# Patient Record
Sex: MALE | Race: BLACK OR AFRICAN AMERICAN | NOT HISPANIC OR LATINO | Employment: FULL TIME | URBAN - METROPOLITAN AREA
[De-identification: names, ages, dates, MRNs, and addresses within clinical notes are randomized per-mention and may not be internally consistent; named-entity substitution may affect disease eponyms.]

---

## 2017-03-20 DIAGNOSIS — A60.01 HERPES SIMPLEX INFECTION OF PENIS: ICD-10-CM

## 2017-03-20 NOTE — TELEPHONE ENCOUNTER
----- Message from Felisha Arambula sent at 3/16/2017  8:35 AM CDT -----  Contact: self / [phone]  Needs a refill on his valacyclovir (VALTREX) 1000 MG tablet

## 2017-03-22 RX ORDER — VALACYCLOVIR HYDROCHLORIDE 1 G/1
1000 TABLET, FILM COATED ORAL EVERY 12 HOURS
Qty: 20 TABLET | Refills: 0 | Status: SHIPPED | OUTPATIENT
Start: 2017-03-22 | End: 2017-12-08 | Stop reason: ALTCHOICE

## 2017-04-21 ENCOUNTER — HOSPITAL ENCOUNTER (EMERGENCY)
Facility: HOSPITAL | Age: 37
Discharge: HOME | End: 2017-04-21
Attending: EMERGENCY MEDICINE
Payer: COMMERCIAL

## 2017-04-21 VITALS
BODY MASS INDEX: 28.28 KG/M2 | SYSTOLIC BLOOD PRESSURE: 134 MMHG | HEIGHT: 71 IN | HEART RATE: 78 BPM | OXYGEN SATURATION: 100 % | WEIGHT: 202 LBS | RESPIRATION RATE: 18 BRPM | DIASTOLIC BLOOD PRESSURE: 88 MMHG | TEMPERATURE: 99 F
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
## 2017-04-21 DIAGNOSIS — R22.0 LIP SWELLING: Primary | ICD-10-CM

## 2017-04-21 DIAGNOSIS — S09.93XA FACIAL TRAUMA, INITIAL ENCOUNTER: ICD-10-CM

## 2017-04-21 PROCEDURE — 99284 EMERGENCY DEPT VISIT MOD MDM: CPT | Mod: ,,, | Performed by: EMERGENCY MEDICINE

## 2017-04-21 PROCEDURE — 99283 EMERGENCY DEPT VISIT LOW MDM: CPT | Mod: 25

## 2017-04-21 PROCEDURE — 96372 THER/PROPH/DIAG INJ SC/IM: CPT

## 2017-04-21 PROCEDURE — 63600175 PHARM REV CODE 636 W HCPCS: Performed by: EMERGENCY MEDICINE

## 2017-04-21 PROCEDURE — 25000003 PHARM REV CODE 250: Performed by: EMERGENCY MEDICINE

## 2017-04-21 RX ORDER — OXYCODONE AND ACETAMINOPHEN 5; 325 MG/1; MG/1
1 TABLET ORAL
Status: COMPLETED | OUTPATIENT
Start: 2017-04-21 | End: 2017-04-21

## 2017-04-21 RX ORDER — PENICILLIN V POTASSIUM 500 MG/1
500 TABLET, FILM COATED ORAL 4 TIMES DAILY
Qty: 40 TABLET | Refills: 0 | Status: SHIPPED | OUTPATIENT
Start: 2017-04-21 | End: 2017-04-28

## 2017-04-21 RX ORDER — OXYCODONE AND ACETAMINOPHEN 5; 325 MG/1; MG/1
1 TABLET ORAL EVERY 6 HOURS PRN
Qty: 20 TABLET | Refills: 0 | Status: SHIPPED | OUTPATIENT
Start: 2017-04-21 | End: 2017-12-08 | Stop reason: ALTCHOICE

## 2017-04-21 RX ADMIN — OXYCODONE HYDROCHLORIDE AND ACETAMINOPHEN 1 TABLET: 5; 325 TABLET ORAL at 07:04

## 2017-04-21 RX ADMIN — PENICILLIN G BENZATHINE AND PENICILLIN G PROCAINE 1.2 MILLION UNITS: 600000; 600000 INJECTION, SUSPENSION INTRAMUSCULAR at 07:04

## 2017-04-21 NOTE — ED AVS SNAPSHOT
[facility]  [address]  [facility] [address]               Alli Lezama   2017  6:47 AM   ED    Description:  Male : 1980   Department:  [facility]           Your Care was Coordinated By:     Provider Role From To    Micky Walker MD Attending Provider 17 0648 --      Reason for Visit     Oral Swelling           Diagnoses this Visit        Comments    Lip swelling    -  Primary     Facial trauma, initial encounter           ED Disposition     ED Disposition Condition Comment    Discharge             To Do List            These Medications        Disp Refills Start End    oxycodone-acetaminophen (PERCOCET) 5-325 mg per tablet 20 tablet 0 2017     Take 1 tablet by mouth every 6 (six) hours as needed. - Oral    Pharmacy: [facility] [id number]  [place] - [address] AT [facility] Ph #: [phone]       penicillin v potassium (VEETID) 500 MG tablet 40 tablet 0 2017    Take 1 tablet (500 mg total) by mouth 4 (four) times daily. - Oral    Pharmacy: [facility] [id number] - [place] - [address] AT [facility] Ph #: [phone]         [facility]     [facility] Nurse Care Line -  Assistance  Unless otherwise directed by your provider, please contact [facility], our nurse care line that is available for  assistance.     Registered nurses in the [facility] provide: appointment scheduling, clinical advisement, health education, and other advisory services.  Call: [phone] (toll free)               Medications           Message regarding Medications     Verify the changes and/or additions to your medication regime listed below are the same as discussed with your clinician today.  If any of these changes or additions are incorrect, please notify your healthcare provider.        START taking these  "NEW medications        Refills    oxycodone-acetaminophen (PERCOCET) 5-325 mg per tablet 0    Sig: Take 1 tablet by mouth every 6 (six) hours as needed.    Class: Print    Route: Oral    penicillin v potassium (VEETID) 500 MG tablet 0    Sig: Take 1 tablet (500 mg total) by mouth 4 (four) times daily.    Class: Print    Route: Oral      These medications were administered today        Dose Freq    oxycodone-acetaminophen 5-325 mg per tablet 1 tablet 1 tablet ED 1 Time    Sig: Take 1 tablet by mouth ED 1 Time.    Class: Normal    Route: Oral    penicillin G procaine-penicillin G benzathine (BICILLIN-CR) injection 1.2 Million Units 1.2 Million Units ED 1 Time    Sig: Inject 2 mLs (1.2 Million Units total) into the muscle ED 1 Time.    Class: Normal    Route: Intramuscular           Verify that the below list of medications is an accurate representation of the medications you are currently taking.  If none reported, the list may be blank. If incorrect, please contact your healthcare provider. Carry this list with you in case of emergency.           Current Medications     oxycodone-acetaminophen (PERCOCET) 5-325 mg per tablet Take 1 tablet by mouth every 6 (six) hours as needed.    oxycodone-acetaminophen 5-325 mg per tablet 1 tablet Take 1 tablet by mouth ED 1 Time.    penicillin G procaine-penicillin G benzathine (BICILLIN-CR) injection 1.2 Million Units Inject 2 mLs (1.2 Million Units total) into the muscle ED 1 Time.    penicillin v potassium (VEETID) 500 MG tablet Take 1 tablet (500 mg total) by mouth 4 (four) times daily.    valacyclovir (VALTREX) 1000 MG tablet Take 1 tablet (1,000 mg total) by mouth every 12 (twelve) hours. FOR SHINGLES           Clinical Reference Information           Your Vitals Were     BP Pulse Temp Resp Height Weight    134/88 78 98.5 °F (36.9 °C) (Oral) 18 5' 11" (1.803 m) 91.6 kg (202 lb)    SpO2 BMI             100% 28.17 kg/m2         Allergies as of 4/21/2017     No Known Allergies "      Immunizations Administered on Date of Encounter - 4/21/2017     None      ED Micro, Lab, POCT     None      ED Imaging Orders     None        Discharge Instructions       Keep the area clean and dry  Take the antibiotics as prescribed  Return if you develop fever, can't swallow or you have noisy respirations  Have the sutures removed as initially instructed    Apolinarspranay Sign-Up     Activating your [facility] account is as easy as 1-2-3!     1) Visit [email or web address], select Sign Up Now, enter this activation code and your date of birth, then select Next.  [email or web address]  Expires: 6/5/2017  7:07 AM      2) Create a username and password to use when you visit [facility] in the future and select a security question in case you lose your password and select Next.    3) Enter your e-mail address and click Sign Up!    Additional Information  If you have questions, please e-mail [email or web address] or call [phone] to talk to our [facility] staff. Remember, [facility] is NOT to be used for urgent needs. For medical emergencies, dial 911.         Smoking Cessation     If you would like to quit smoking:   You may be eligible for free services if you are a [place] resident and started smoking cigarettes before September 1, 1988.  Call the Smoking Cessation Trust (SCT) toll free at (168) 630-8321 or (052) 060-5731.   Call 2-279-QUIT-NOW if you do not meet the above criteria.   Contact us via email: [email or web address]   View our website for more information: [email or web address]         [facility] complies with applicable Federal civil rights laws and does not discriminate on the basis of race, color, national origin, age, disability, or sex.        Language Assistance Services     ATTENTION: Language assistance services are available, free of charge. Please call [phone].      ATENCIÓN: Si habla español, tiene a zayas disposición servicios gratuitos de asistencia lingüística.  Michle vega [phone].     CYNDIE Ý: N?u b?n nói Ti?ng Vi?t, có các d?ch v? h? tr? angelaôn ng? mi?n phí dành cho b?n. G?i s? [phone].

## 2017-04-21 NOTE — ED TRIAGE NOTES
Injured at work yesterday, was hit in the lip by a crowbar, and went to an urgent care yesterday and received 7 stitches on the outer lip and 7 stitches to inner lip, and today woke up in pain, and was not given pain medication or antibiotics. Pt rates pain 9/10.

## 2017-04-21 NOTE — DISCHARGE INSTRUCTIONS
Keep the area clean and dry  Take the antibiotics as prescribed  Return if you develop fever, can't swallow or you have noisy respirations  Have the sutures removed as initially instructed

## 2017-04-21 NOTE — ED PROVIDER NOTES
Encounter Date: 4/21/2017    SCRIBE #1 NOTE: I, Italiaflore[address]ita Street, am scribing for, and in the presence of, Dr. Walker.       History     Chief Complaint   Patient presents with    Oral Swelling     pt states he was changing tire yesterday and bar slipped out and hit him in the face, was seen by primary MD and has stiches placed and today woke up with super swollen bottom lip, reports pain     Review of patient's allergies indicates:  No Known Allergies  HPI Comments: Time patient was seen by the provider: 6:48 AM      The patient is a 37 y.o. male with hx of: HTN, gout, hernia repair that presents to the ED with a complaint of oral swelling. Pt was changing a tire yesterday and the bar slipped out and hit him in the face. He also chipped some of his teeth. Pt was seen by his PCP and had stiches placed but was not placed on antibiotics. He woke today with swelling to the bottom lip and associated pain. NKDA. He denies fever, difficulty swallowing, N/V.    The history is provided by the patient.     Past Medical History:   Diagnosis Date    Cardiac murmur     Chlamydia infection     Cluster headaches     Costochondritis     Gout, chronic     Hypertension      Past Surgical History:   Procedure Laterality Date    HERNIA REPAIR       Family History   Problem Relation Age of Onset    Hypertension Mother     Heart disease Maternal Grandmother      Social History   Substance Use Topics    Smoking status: Former Smoker     Quit date: 1/20/2001    Smokeless tobacco: None    Alcohol use Yes      Comment: occ beer     Review of Systems   Constitutional: Negative for activity change, chills, fatigue and fever.   HENT: Positive for facial swelling. Negative for sinus pressure, sore throat and trouble swallowing.    Eyes: Negative for photophobia and visual disturbance.   Gastrointestinal: Negative for nausea and vomiting.   Skin: Positive for wound.   Neurological: Negative for weakness and light-headedness.    Hematological: Does not bruise/bleed easily.       Physical Exam   Initial Vitals   BP Pulse Resp Temp SpO2   04/21/17 0611 04/21/17 0611 04/21/17 0611 04/21/17 0611 04/21/17 0611   134/88 78 18 98.5 °F (36.9 °C) 100 %     Physical Exam    Nursing note and vitals reviewed.  Constitutional: He is cooperative. He does not appear ill. No distress.   HENT:   Head: Normocephalic.   No trismus  Sutured laceration of lip of localized swelling   Eyes: Conjunctivae and lids are normal.   Neck: Normal range of motion. Neck supple.   Musculoskeletal: Normal range of motion.   Neurological: He is alert. No cranial nerve deficit. GCS eye subscore is 4. GCS verbal subscore is 5. GCS motor subscore is 6.   Skin:   Swelling of the lower lip of face that is sutured no drainage         ED Course   Procedures  Labs Reviewed - No data to display          Medical Decision Making:   History:   Old Medical Records: I decided to obtain old medical records.  Initial Assessment:   Sutured laceration of lip now with localized swelling this followed after trauma wound was sutured no antibiotics given no need for imaging or labs            Scribe Attestation:   Scribe #1: I performed the above scribed service and the documentation accurately describes the services I performed. I attest to the accuracy of the note.    Attending Attestation:           Physician Attestation for Scribe:  Physician Attestation Statement for Scribe #1: I, Dr. Walker, reviewed documentation, as scribed by Italia Street in my presence, and it is both accurate and complete.         Attending ED Notes:   Patient evaluated in the ED because of significant swelling of the face and lips patient was struck while attempting to place a tire on of we'll sustaining an laceration of lower lip patient was evaluated by physician sutured patient was not placed on antibiotics patient states that he had no foreign body noted either physician he is concerned because of  continued swelling this could just be localized trauma however concern for possibility of early infection patient was placed on antibiotics by mouth and the patient is discharged          ED Course     Clinical Impression:   The primary encounter diagnosis was Lip swelling. A diagnosis of Facial trauma, initial encounter was also pertinent to this visit.    Disposition:   Disposition: Discharged  Condition: Stable       Micky Walker MD  05/08/17 0912

## 2017-04-21 NOTE — ED NOTES
Pt instructed on shot time.  Pt ambulatory at discharge.  No distress. Discharge instructions given.  Vitals stable.  rx given.  Walked to checkout.

## 2017-09-19 ENCOUNTER — OFFICE VISIT (OUTPATIENT)
Dept: FAMILY MEDICINE | Facility: HOSPITAL | Age: 37
End: 2017-09-19
Attending: FAMILY MEDICINE
Payer: COMMERCIAL

## 2017-09-19 VITALS
SYSTOLIC BLOOD PRESSURE: 129 MMHG | BODY MASS INDEX: 28.18 KG/M2 | WEIGHT: 196.88 LBS | DIASTOLIC BLOOD PRESSURE: 85 MMHG | HEIGHT: 70 IN | HEART RATE: 109 BPM

## 2017-09-19 DIAGNOSIS — Z00.00 HEALTHCARE MAINTENANCE: Primary | ICD-10-CM

## 2017-09-19 DIAGNOSIS — Z20.2 STD EXPOSURE: ICD-10-CM

## 2017-09-19 PROCEDURE — 99213 OFFICE O/P EST LOW 20 MIN: CPT | Performed by: FAMILY MEDICINE

## 2017-09-19 RX ORDER — AZITHROMYCIN 1 G/1
1 POWDER, FOR SUSPENSION ORAL ONCE
Qty: 1 PACKET | Refills: 0 | Status: SHIPPED | OUTPATIENT
Start: 2017-09-19 | End: 2017-09-19

## 2017-09-19 NOTE — PROGRESS NOTES
I have reviewed the notes, assessments, and/or procedures performed by Dr. Valera, I concur with her/his documentation of Alli Lezama.

## 2017-09-19 NOTE — PROGRESS NOTES
Subjective:       Patient ID: Alli Lezama is a 37 y.o. male.    Chief Complaint: Exposure to STD    HPI   37 year old male presented for exposure to STD. He has had Chlamydia in the past and states that his wife was recently treated for a STD. He denies any dysuria, penile discharge or lesions. He denies any fevers or chills.    Review of Systems   Constitutional: Negative for fatigue and fever.   HENT: Negative for sore throat.    Eyes: Negative for visual disturbance.   Cardiovascular: Negative for chest pain.   Gastrointestinal: Negative for diarrhea and vomiting.   Genitourinary: Negative for difficulty urinating, discharge, dysuria, penile pain, penile swelling and scrotal swelling.   Neurological: Negative for headaches.       Objective:      Vitals:    09/19/17 1453   BP: 129/85   Pulse: 109     Physical Exam   Constitutional: He is oriented to person, place, and time. He appears well-developed and well-nourished.   HENT:   Head: Normocephalic and atraumatic.   Neck: Normal range of motion.   Cardiovascular: Normal rate and regular rhythm.    No murmur heard.  Pulmonary/Chest: Effort normal. No respiratory distress.   Abdominal: Soft.   Musculoskeletal: Normal range of motion.   Neurological: He is alert and oriented to person, place, and time.   Skin: Skin is warm and dry.       Assessment:       1. Healthcare maintenance    2. STD exposure        Plan:       Healthcare maintenance  -     Lipid panel; Future; Expected date: 09/19/2017    STD exposure  -     azithromycin (ZITHROMAX) 1 gram Pack; Take 1 g by mouth once.  Dispense: 1 packet; Refill: 0      Return if symptoms worsen or fail to improve.      Pt discussed with Dr. Lawrence.    Alessia Valera MD

## 2017-12-08 ENCOUNTER — HOSPITAL ENCOUNTER (EMERGENCY)
Facility: HOSPITAL | Age: 37
Discharge: HOME | End: 2017-12-08
Attending: EMERGENCY MEDICINE
Payer: COMMERCIAL

## 2017-12-08 VITALS
TEMPERATURE: 99 F | HEIGHT: 70 IN | WEIGHT: 202 LBS | DIASTOLIC BLOOD PRESSURE: 108 MMHG | RESPIRATION RATE: 19 BRPM | HEART RATE: 78 BPM | OXYGEN SATURATION: 100 % | SYSTOLIC BLOOD PRESSURE: 163 MMHG | BODY MASS INDEX: 28.92 KG/M2

## 2017-12-08 DIAGNOSIS — K62.5 RECTAL BLEEDING: Primary | ICD-10-CM

## 2017-12-08 PROCEDURE — 99283 EMERGENCY DEPT VISIT LOW MDM: CPT

## 2017-12-08 RX ORDER — DOCUSATE SODIUM 100 MG/1
100 CAPSULE, LIQUID FILLED ORAL 2 TIMES DAILY
Qty: 60 CAPSULE | Refills: 0 | Status: SHIPPED | OUTPATIENT
Start: 2017-12-08 | End: 2018-06-07

## 2017-12-08 NOTE — ED PROVIDER NOTES
Encounter Date: 12/8/2017       History     Chief Complaint   Patient presents with    Rectal Bleeding     Blood noted afterwiping post bowel movment this morning.  Denies hemorrhoids, denies abd pain, N/V/D     37-year-old male with past medical history of hypertension presents to the ED for evaluation of rectal bleeding.  The patient reports that he had a large hard bowel movement this morning, and noticed bright red blood on the toilet paper after wiping.  He reports that his stool was also streaked with bright red blood.  No fever, chills, trauma, abdominal pain, nausea/vomiting/diarrhea, or wound.  He denies history of hemorrhoids.  He denies rectal pain.  No history of bowel surgery or colonoscopy.  No other complaints this time.      The history is provided by the patient.   Rectal Bleeding    The current episode started just prior to arrival. The onset was sudden. Episode frequency: once. The problem has been resolved. The pain is at a severity of 0/10. The stool is described as hard and streaked with blood. There was no prior successful therapy. There was no prior unsuccessful therapy. Pertinent negatives include no anorexia, no fever, no abdominal pain, no diarrhea, no hemorrhoids, no nausea, no rectal pain, no vomiting, no hematuria, no chest pain, no headaches, no coughing, no difficulty breathing and no rash. He has been eating and drinking normally. Urine output has been normal. The last void occurred less than 6 hours ago. His past medical history does not include abdominal surgery, inflammatory bowel disease, recent abdominal injury, recent antibiotic use, recent change in diet or a recent illness. There were sick contacts none. He has received no recent medical care.     Review of patient's allergies indicates:  No Known Allergies  Past Medical History:   Diagnosis Date    Cardiac murmur     Chlamydia infection     Cluster headaches     Costochondritis     Gout, chronic     Hypertension       Past Surgical History:   Procedure Laterality Date    HERNIA REPAIR       Family History   Problem Relation Age of Onset    Hypertension Mother     Heart disease Maternal Grandmother      Social History   Substance Use Topics    Smoking status: Former Smoker     Quit date: 1/20/2001    Smokeless tobacco: Not on file    Alcohol use Yes      Comment: occ beer     Review of Systems   Constitutional: Negative for chills and fever.   HENT: Negative for congestion.    Respiratory: Negative for cough and shortness of breath.    Cardiovascular: Negative for chest pain.   Gastrointestinal: Positive for anal bleeding, blood in stool and hematochezia. Negative for abdominal pain, anorexia, diarrhea, hemorrhoids, nausea, rectal pain and vomiting.   Genitourinary: Negative for dysuria and hematuria.   Musculoskeletal: Negative for back pain.   Skin: Negative for rash.   Allergic/Immunologic: Negative for immunocompromised state.   Neurological: Negative for dizziness, weakness, light-headedness and headaches.   Hematological: Does not bruise/bleed easily.   Psychiatric/Behavioral: Negative for confusion.       Physical Exam     Initial Vitals [12/08/17 1211]   BP Pulse Resp Temp SpO2   (!) 163/108 78 19 98.6 °F (37 °C) 100 %      MAP       126.33         Physical Exam    Nursing note and vitals reviewed.  Constitutional: Vital signs are normal. He appears well-developed and well-nourished. He is active and cooperative. He is easily aroused.  Non-toxic appearance. He does not have a sickly appearance. He does not appear ill. No distress.   HENT:   Head: Normocephalic and atraumatic.   Eyes: Conjunctivae are normal.   Neck: Normal range of motion.   Cardiovascular: Normal rate and regular rhythm.   Murmur heard.   No systolic murmur is present   Pulmonary/Chest: Effort normal and breath sounds normal.   Abdominal: Soft. Normal appearance and bowel sounds are normal. He exhibits no distension. There is no tenderness.  There is no rigidity, no rebound and no guarding.   Genitourinary: Rectal exam shows no external hemorrhoid, no internal hemorrhoid, no fissure, no mass, no tenderness and anal tone normal.   Genitourinary Comments: No BRB.  No visualized or palpated hemorrhoids.  No bleeding noted.    Neurological: He is alert, oriented to person, place, and time and easily aroused. GCS eye subscore is 4. GCS verbal subscore is 5. GCS motor subscore is 6.   Skin: Skin is warm, dry and intact. No abrasion, no bruising and no rash noted. No erythema.   Psychiatric: He has a normal mood and affect. His speech is normal and behavior is normal. Judgment and thought content normal. Cognition and memory are normal.         ED Course   Procedures  Labs Reviewed   OCCULT BLOOD X 1, STOOL             Medical Decision Making:   Initial Assessment:   37-year-old male here for one episode of bright red blood on the tissue when wiping after a large hard bowel movement this morning.  No trauma, fever/chills, abdominal pain, or anticoagulant use.  He denies lightheadedness, dizziness, weakness.  Abd soft, non-tender, no r/r/g, no distention.  Pt denies rectal pain.  Rectal exam without BRB.  Differential Diagnosis:   Constipation, fissure, hemorrhoid  ED Management:  Rectal Exam  No BRB noted on rectal exam.  Pt likely has internal hemorrhoid.  I advised increased fluid intake and f/u with PCP within 3 days.  I reviewed strict return precautions.  Pt verbalized understanding, compliance, and agreement with the treatment plan.    RX Colace.              Attending Attestation:     Physician Attestation Statement for NP/PA:   I have conducted a face to face encounter with this patient in addition to the NP/PA, due to NP/PA Request    Other NP/PA Attestation Additions:    History of Present Illness: Agree; 37-year-old male presents emergency department complaining of rectal bleeding.  Patient reports a hard bowel movement earlier and noticed bright  red blood per after wiping.  No other symptoms reported.   Physical Exam: agree   Medical Decision Making: Agree; bright red blood on rectal exam with no external hemorrhoids.  Informed of likely diagnosis of internal hemorrhoids, given a prescription for Colace, instructed to follow-up with primary care physician, return with new or worsening symptoms.                 ED Course      Clinical Impression:   The encounter diagnosis was Rectal bleeding.                           FLORENCE Lim  12/08/17 1301       Zain Francisco MD  12/12/17 2352

## 2017-12-08 NOTE — ED TRIAGE NOTES
Pt reports was at work and after having a bowel movement noticed bright blood on toilet paper and a small amount in toilet.  Denies abdominal pain, denies N/V.  BP elevated at triage, denies HTN or taking any BP meds.

## 2018-05-22 ENCOUNTER — OFFICE VISIT (OUTPATIENT)
Dept: FAMILY MEDICINE | Facility: HOSPITAL | Age: 38
End: 2018-05-22
Attending: FAMILY MEDICINE
Payer: COMMERCIAL

## 2018-05-22 ENCOUNTER — LAB VISIT (OUTPATIENT)
Dept: LAB | Facility: HOSPITAL | Age: 38
End: 2018-05-22
Attending: FAMILY MEDICINE
Payer: COMMERCIAL

## 2018-05-22 VITALS
BODY MASS INDEX: 28.58 KG/M2 | HEART RATE: 94 BPM | SYSTOLIC BLOOD PRESSURE: 137 MMHG | WEIGHT: 204.13 LBS | HEIGHT: 71 IN | DIASTOLIC BLOOD PRESSURE: 88 MMHG

## 2018-05-22 DIAGNOSIS — A64 STD (MALE): ICD-10-CM

## 2018-05-22 DIAGNOSIS — B00.9 HERPES: Primary | ICD-10-CM

## 2018-05-22 LAB
C TRACH DNA SPEC QL NAA+PROBE: NOT DETECTED
N GONORRHOEA DNA SPEC QL NAA+PROBE: NOT DETECTED

## 2018-05-22 PROCEDURE — 87491 CHLMYD TRACH DNA AMP PROBE: CPT

## 2018-05-22 PROCEDURE — 96372 THER/PROPH/DIAG INJ SC/IM: CPT

## 2018-05-22 PROCEDURE — 99213 OFFICE O/P EST LOW 20 MIN: CPT | Performed by: FAMILY MEDICINE

## 2018-05-22 PROCEDURE — 86592 SYPHILIS TEST NON-TREP QUAL: CPT

## 2018-05-22 PROCEDURE — 86703 HIV-1/HIV-2 1 RESULT ANTBDY: CPT

## 2018-05-22 PROCEDURE — 36415 COLL VENOUS BLD VENIPUNCTURE: CPT

## 2018-05-22 RX ORDER — AZITHROMYCIN 1 G/1
1 POWDER, FOR SUSPENSION ORAL ONCE
Qty: 1 PACKET | Refills: 0 | Status: SHIPPED | OUTPATIENT
Start: 2018-05-22 | End: 2018-05-22

## 2018-05-22 RX ORDER — CEFTRIAXONE 250 MG/1
250 INJECTION, POWDER, FOR SOLUTION INTRAMUSCULAR; INTRAVENOUS ONCE
Status: COMPLETED | OUTPATIENT
Start: 2018-05-22 | End: 2018-05-22

## 2018-05-22 RX ORDER — VALACYCLOVIR HYDROCHLORIDE 1 G/1
1000 TABLET, FILM COATED ORAL EVERY 12 HOURS
Qty: 10 TABLET | Refills: 0 | Status: SHIPPED | OUTPATIENT
Start: 2018-05-22 | End: 2018-06-07 | Stop reason: SDUPTHER

## 2018-05-22 RX ADMIN — CEFTRIAXONE SODIUM 250 MG: 250 INJECTION, POWDER, FOR SOLUTION INTRAMUSCULAR; INTRAVENOUS at 04:05

## 2018-05-22 NOTE — PROGRESS NOTES
Subjective:       Patient ID: Alli Lezama is a 38 y.o. male.    Chief Complaint: Exposure to STD    HPI   38 year old male with a history of Chlamydia and Herpes Genitalis that presents with 72 hours of penile lesions. He states that he had sex with a new partner in the last few months. He would like to be tested for STDs again. He denies any dysuria or hematuria. He denies any penile discharge, however his penis is sensitive and he has multiple small lesions noted around the head of the penis that are fluid filled.   Review of Systems   Constitutional: Negative for fatigue and fever.   HENT: Negative for sore throat.    Eyes: Negative for visual disturbance.   Respiratory: Negative for shortness of breath and wheezing.    Cardiovascular: Negative for chest pain and palpitations.   Gastrointestinal: Negative for nausea and vomiting.   Genitourinary: Positive for genital sores and penile pain. Negative for decreased urine volume, difficulty urinating, discharge, dysuria, hematuria and testicular pain.   Musculoskeletal: Negative for neck stiffness.   Skin: Negative for rash.   Neurological: Negative for headaches.       Objective:      Vitals:    05/22/18 1500   BP: 137/88   Pulse: 94     Physical Exam   Constitutional: He is oriented to person, place, and time. He appears well-developed and well-nourished.   HENT:   Head: Normocephalic and atraumatic.   Neck: Normal range of motion.   Pulmonary/Chest: Effort normal. No respiratory distress.   Abdominal: Soft. He exhibits no distension. There is no tenderness.   Genitourinary: Right testis shows no tenderness. Left testis shows no tenderness. Penile erythema and penile tenderness present. No discharge found.         Musculoskeletal: Normal range of motion.   Lymphadenopathy: No inguinal adenopathy noted on the right or left side.   Neurological: He is alert and oriented to person, place, and time.   Skin: Skin is warm and dry.   Psychiatric: He has a normal mood  and affect.       Assessment:       1. Herpes    2. STD (male)        Plan:       Herpes  -     valACYclovir (VALTREX) 1000 MG tablet; Take 1 tablet (1,000 mg total) by mouth every 12 (twelve) hours.  Dispense: 10 tablet; Refill: 0  -     Viral Culture [facility]; Other (Specify); Future; Expected date: 05/22/2018    STD (male)  -     HIV-1 and HIV-2 antibodies; Future; Expected date: 05/22/2018  -     RPR; Future; Expected date: 05/22/2018  -     C. trachomatis/N. gonorrhoeae by AMP DNA Urine; Future; Expected date: 05/22/2018  -     cefTRIAXone injection 250 mg; Inject 250 mg into the muscle once.  -     azithromycin (ZITHROMAX) 1 gram Pack; Take 1 g by mouth once.  Dispense: 1 packet; Refill: 0  -     valACYclovir (VALTREX) 1000 MG tablet; Take 1 tablet (1,000 mg total) by mouth every 12 (twelve) hours.  Dispense: 10 tablet; Refill: 0  -     Viral Culture [facility]; Other (Specify); Future; Expected date: 05/22/2018      Follow-up in about 2 weeks (around 6/5/2018).      Pt discussed with Dr. Lawrence.    Will give him treatment for his wife if his labs are positive.    Alessia Valera MD

## 2018-05-23 LAB
HIV 1+2 AB+HIV1 P24 AG SERPL QL IA: NEGATIVE
RPR SER QL: NORMAL

## 2018-06-07 ENCOUNTER — OFFICE VISIT (OUTPATIENT)
Dept: FAMILY MEDICINE | Facility: HOSPITAL | Age: 38
End: 2018-06-07
Attending: FAMILY MEDICINE
Payer: COMMERCIAL

## 2018-06-07 VITALS
HEART RATE: 91 BPM | HEIGHT: 71 IN | BODY MASS INDEX: 29.16 KG/M2 | SYSTOLIC BLOOD PRESSURE: 143 MMHG | WEIGHT: 208.31 LBS | DIASTOLIC BLOOD PRESSURE: 98 MMHG

## 2018-06-07 DIAGNOSIS — A64 STD (MALE): ICD-10-CM

## 2018-06-07 DIAGNOSIS — B00.9 HERPES: ICD-10-CM

## 2018-06-07 PROBLEM — A74.9 CHLAMYDIA: Status: ACTIVE | Noted: 2018-06-07

## 2018-06-07 PROCEDURE — 99213 OFFICE O/P EST LOW 20 MIN: CPT | Performed by: FAMILY MEDICINE

## 2018-06-07 RX ORDER — VALACYCLOVIR HYDROCHLORIDE 1 G/1
1000 TABLET, FILM COATED ORAL EVERY 12 HOURS
Qty: 10 TABLET | Refills: 0 | Status: SHIPPED | OUTPATIENT
Start: 2018-06-07 | End: 2019-08-23

## 2018-06-07 NOTE — PROGRESS NOTES
Subjective:       Patient ID: Alli Lezama is a 38 y.o. male.    Chief Complaint: STD follow-up    HPI   38 year old male presents for follow up of STD check. His G/C was negative and he was empirically treated for herpes as he has been treated with Herpes previously. He completed the course of Valtrex with resolution of his symptoms. He denies any current penile lesions or dysuria.     Review of Systems   Constitutional: Negative for fever.   HENT: Negative for sore throat.    Eyes: Negative for visual disturbance.   Cardiovascular: Negative for chest pain.   Gastrointestinal: Negative for nausea and vomiting.   Genitourinary: Negative for difficulty urinating, discharge, dysuria and penile pain.   Skin: Negative for rash.   Neurological: Negative for headaches.   All other systems reviewed and are negative.      Objective:      Vitals:    06/07/18 1023   BP: (!) 143/98   Pulse: 91     Physical Exam   Constitutional: He is oriented to person, place, and time. He appears well-developed and well-nourished.   HENT:   Head: Normocephalic and atraumatic.   Neck: Normal range of motion.   Pulmonary/Chest: Effort normal. No respiratory distress. He has no wheezes.   Abdominal: Soft.   Musculoskeletal: Normal range of motion.   Neurological: He is alert and oriented to person, place, and time.   Skin: Skin is warm and dry. No rash noted.   Psychiatric: He has a normal mood and affect.       Assessment:       1. Herpes    2. STD (male)        Plan:       Herpes  -     valACYclovir (VALTREX) 1000 MG tablet; Take 1 tablet (1,000 mg total) by mouth every 12 (twelve) hours.  Dispense: 10 tablet; Refill: 0, written for patient in case of penile lesions so that he may start taking medicine if signs or symptoms present    Chlamydia previously        -      Negative on last lab    Follow-up if symptoms worsen or fail to improve.        Pt discussed with Dr. Joe.    Alessia Valera MD

## 2018-06-14 NOTE — PROGRESS NOTES
I was present in clinic during the visit and assume the primary medical care of this patient.  I discussed the case with Dr. Valera, and I have reviewed and agree with the assessment and plan.

## 2019-02-08 ENCOUNTER — HOSPITAL ENCOUNTER (EMERGENCY)
Facility: HOSPITAL | Age: 39
Discharge: HOME | End: 2019-02-08
Attending: EMERGENCY MEDICINE
Payer: COMMERCIAL

## 2019-02-08 VITALS
HEART RATE: 117 BPM | RESPIRATION RATE: 18 BRPM | SYSTOLIC BLOOD PRESSURE: 143 MMHG | BODY MASS INDEX: 29.4 KG/M2 | WEIGHT: 210 LBS | DIASTOLIC BLOOD PRESSURE: 92 MMHG | HEIGHT: 71 IN | OXYGEN SATURATION: 100 % | TEMPERATURE: 101 F

## 2019-02-08 DIAGNOSIS — R50.9 FEVER, UNSPECIFIED FEVER CAUSE: ICD-10-CM

## 2019-02-08 DIAGNOSIS — R68.89 FLU-LIKE SYMPTOMS: Primary | ICD-10-CM

## 2019-02-08 DIAGNOSIS — R52 BODY ACHES: ICD-10-CM

## 2019-02-08 LAB
CTP QC/QA: YES
POC MOLECULAR INFLUENZA A AGN: NEGATIVE
POC MOLECULAR INFLUENZA B AGN: NEGATIVE

## 2019-02-08 PROCEDURE — 25000003 PHARM REV CODE 250: Performed by: PHYSICIAN ASSISTANT

## 2019-02-08 PROCEDURE — 99284 PR EMERGENCY DEPT VISIT,LEVEL IV: ICD-10-PCS | Mod: ,,, | Performed by: PHYSICIAN ASSISTANT

## 2019-02-08 PROCEDURE — 99284 EMERGENCY DEPT VISIT MOD MDM: CPT | Mod: ,,, | Performed by: PHYSICIAN ASSISTANT

## 2019-02-08 PROCEDURE — 99283 EMERGENCY DEPT VISIT LOW MDM: CPT

## 2019-02-08 RX ORDER — PROMETHAZINE HYDROCHLORIDE AND DEXTROMETHORPHAN HYDROBROMIDE 6.25; 15 MG/5ML; MG/5ML
SYRUP ORAL
Qty: 60 ML | Refills: 0 | Status: SHIPPED | OUTPATIENT
Start: 2019-02-08 | End: 2019-08-23

## 2019-02-08 RX ORDER — ACETAMINOPHEN 500 MG
1000 TABLET ORAL
Status: COMPLETED | OUTPATIENT
Start: 2019-02-08 | End: 2019-02-08

## 2019-02-08 RX ADMIN — ACETAMINOPHEN 1000 MG: 500 TABLET ORAL at 04:02

## 2019-02-08 NOTE — ED TRIAGE NOTES
Pt reports body aches, productive cough, nasal drainage x 2 days; pt A&Ox4; respirations even, unlabored; lung sounds clear

## 2019-02-08 NOTE — ED PROVIDER NOTES
Encounter Date: 2019       History     Chief Complaint   Patient presents with    Influenza     started yest, cough, congestion, fever      The patient presents to the ER for an emergent evaluation due to flu symptoms. He reports having fever, chills, body aches, cough, and congestion since yesterday. He states that the degree is moderate. He states that the course is constant. He denies any additional symptoms. He denies any pre-arrival treatment. He did not get a flu shot this year. He states that 2 guys he works in close proximity with at [employer] recently had the flu.           Review of patient's allergies indicates:  No Known Allergies  Past Medical History:   Diagnosis Date    Cardiac murmur     Chlamydia infection     Cluster headaches     Costochondritis     Gout, chronic     Hypertension      Past Surgical History:   Procedure Laterality Date    HERNIA REPAIR       Family History   Problem Relation Age of Onset    Hypertension Mother     Heart disease Maternal Grandmother      Social History     Tobacco Use    Smoking status: Former Smoker     Last attempt to quit: 2001     Years since quittin.0    Smokeless tobacco: Never Used   Substance Use Topics    Alcohol use: Yes     Comment: occ beer    Drug use: No     Review of Systems   Constitutional: Positive for chills and fever.   HENT: Positive for congestion and rhinorrhea. Negative for sore throat.    Eyes: Negative for discharge and redness.   Respiratory: Positive for cough. Negative for shortness of breath, wheezing and stridor.    Cardiovascular: Negative for chest pain, palpitations and leg swelling.   Gastrointestinal: Negative for abdominal pain, diarrhea, nausea and vomiting.   Genitourinary: Negative for decreased urine volume.   Musculoskeletal: Positive for myalgias. Negative for arthralgias, back pain, gait problem, neck pain and neck stiffness.   Skin: Negative for color change and rash.   Allergic/Immunologic:  Negative for immunocompromised state.   Neurological: Negative for dizziness, seizures, syncope, weakness, light-headedness, numbness and headaches.   Hematological: Negative for adenopathy.   Psychiatric/Behavioral: Negative for confusion.       Physical Exam     Initial Vitals [02/08/19 1557]   BP Pulse Resp Temp SpO2   (!) 143/92 (!) 123 18 (!) 101.7 °F (38.7 °C) 100 %      MAP       --         Physical Exam    Nursing note and vitals reviewed.  Constitutional: He appears well-developed and well-nourished.   HENT:   Head: Normocephalic.   Mouth/Throat: Oropharynx is clear and moist. No oropharyngeal exudate.   Swollen nasal mucosa. Rhinorrhea. Clear oropharynx. No adenopathy. Normal otic exam.    Eyes: Conjunctivae are normal. Right eye exhibits no discharge. Left eye exhibits no discharge.   Neck: Normal range of motion. Neck supple.   Non-tender. No nuchal rigidity. No meningeal signs.    Cardiovascular: Normal rate, regular rhythm and intact distal pulses.   Pulmonary/Chest: Breath sounds normal. No respiratory distress. He has no wheezes. He has no rhonchi. He has no rales.   Occasional wet cough.    Abdominal: Soft. There is no tenderness. There is no rebound and no guarding.   Musculoskeletal: Normal range of motion.   Lymphadenopathy:     He has no cervical adenopathy.   Neurological: He is alert and oriented to person, place, and time. He has normal strength. No sensory deficit.   Skin: Skin is warm and dry. No rash noted.   Psychiatric: He has a normal mood and affect. His behavior is normal.         ED Course   Procedures  Labs Reviewed   POCT INFLUENZA A/B MOLECULAR          Imaging Results    None          Medical Decision Making:   Initial Assessment:   Cold and flu symptoms since yesterday.   Differential Diagnosis:   Influenza, Viral syndrome, URI, Strep, Pneumonia, Sepsis, etc   Clinical Tests:   Lab Tests: Ordered and Reviewed  ED Management:  Tylenol given for fever and aches in the ER   Flu  swab negative   Rx for symptomatic medication provided   Advised lots of fluid, Tylenol, and Motrin   Advised close follow up with primary care in the next 1-2 days for re-check   Advised prompt return to the ER if worse in any way                       Clinical Impression:   The primary encounter diagnosis was Flu-like symptoms. Diagnoses of Fever, unspecified fever cause and Body aches were also pertinent to this visit.      Disposition:   Disposition: Discharged  Condition: Stable                        Dennis Ortiz PA-C  02/08/19 4355

## 2019-07-05 ENCOUNTER — HOSPITAL ENCOUNTER (EMERGENCY)
Facility: HOSPITAL | Age: 39
Discharge: HOME | End: 2019-07-05
Attending: EMERGENCY MEDICINE
Payer: COMMERCIAL

## 2019-07-05 VITALS
HEIGHT: 70 IN | WEIGHT: 190 LBS | DIASTOLIC BLOOD PRESSURE: 78 MMHG | BODY MASS INDEX: 27.2 KG/M2 | OXYGEN SATURATION: 100 % | RESPIRATION RATE: 15 BRPM | HEART RATE: 90 BPM | TEMPERATURE: 98 F | SYSTOLIC BLOOD PRESSURE: 138 MMHG

## 2019-07-05 DIAGNOSIS — R51.9 NONINTRACTABLE HEADACHE, UNSPECIFIED CHRONICITY PATTERN, UNSPECIFIED HEADACHE TYPE: Primary | ICD-10-CM

## 2019-07-05 PROCEDURE — 96361 HYDRATE IV INFUSION ADD-ON: CPT

## 2019-07-05 PROCEDURE — 99284 EMERGENCY DEPT VISIT MOD MDM: CPT | Mod: 25

## 2019-07-05 PROCEDURE — 96374 THER/PROPH/DIAG INJ IV PUSH: CPT

## 2019-07-05 PROCEDURE — 63600175 PHARM REV CODE 636 W HCPCS: Performed by: PHYSICIAN ASSISTANT

## 2019-07-05 PROCEDURE — 99284 PR EMERGENCY DEPT VISIT,LEVEL IV: ICD-10-PCS | Mod: ,,, | Performed by: PHYSICIAN ASSISTANT

## 2019-07-05 PROCEDURE — 25000003 PHARM REV CODE 250: Performed by: PHYSICIAN ASSISTANT

## 2019-07-05 PROCEDURE — 99284 EMERGENCY DEPT VISIT MOD MDM: CPT | Mod: ,,, | Performed by: PHYSICIAN ASSISTANT

## 2019-07-05 RX ORDER — BUTALBITAL, ACETAMINOPHEN AND CAFFEINE 50; 325; 40 MG/1; MG/1; MG/1
1 TABLET ORAL EVERY 4 HOURS PRN
Qty: 20 TABLET | Refills: 0 | Status: SHIPPED | OUTPATIENT
Start: 2019-07-05 | End: 2019-08-04

## 2019-07-05 RX ORDER — METOCLOPRAMIDE HYDROCHLORIDE 5 MG/ML
10 INJECTION INTRAMUSCULAR; INTRAVENOUS
Status: COMPLETED | OUTPATIENT
Start: 2019-07-05 | End: 2019-07-05

## 2019-07-05 RX ADMIN — METOCLOPRAMIDE 10 MG: 5 INJECTION, SOLUTION INTRAMUSCULAR; INTRAVENOUS at 02:07

## 2019-07-05 RX ADMIN — SODIUM CHLORIDE 1000 ML: 0.9 INJECTION, SOLUTION INTRAVENOUS at 02:07

## 2019-07-05 NOTE — ED PROVIDER NOTES
Encounter Date: 2019    SCRIBE #1 NOTE: I, Son Vicki, am scribing for, and in the presence of,  Dr. Padilla . I have scribed the following portions of the note - the APC attestation.       History     Chief Complaint   Patient presents with    Headache     stab pain to L side of head after working outside     39-year-old male with a history of migraines (self reported), cluster headaches (listed in medical history) presents to the ED for evaluation of headache.  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  Patient reports a sharp left parietal headache that began once he return to his service truck.  Patient took 2 Excedrin migraine which improved the headache, but he felt another similar pain a while later.  Patient states that the pain feels similar to migraines that he has had in the past.  He does note that he has not had a migraine in many years.  Patient states that he also drink plenty of alcohol yesterday for  and was outside in the heat all day.  Patient reports an episode of vomiting while in the ER.  He currently denies nausea and reports some mild, dull left parietal headache. Patient also complains of mild generalized weakness, noting that he has not eaten lunch yet.  Denies fever, chills, dizziness, lightheadedness, vision changes, photophobia, numbness or tingling.        Review of patient's allergies indicates:  No Known Allergies  Past Medical History:   Diagnosis Date    Cardiac murmur     Chlamydia infection     Cluster headaches     Costochondritis     Gout, chronic     Hypertension      Past Surgical History:   Procedure Laterality Date    HERNIA REPAIR       Family History   Problem Relation Age of Onset    Hypertension Mother     Heart disease Maternal Grandmother      Social History     Tobacco Use    Smoking status: Former Smoker     Last attempt to quit: 2001     Years since quittin.4    Smokeless  tobacco: Never Used   Substance Use Topics    Alcohol use: Yes     Comment: occ beer    Drug use: No     Review of Systems   Constitutional: Negative for chills and fever.   HENT: Negative for sore throat.    Respiratory: Negative for shortness of breath.    Cardiovascular: Negative for chest pain.   Gastrointestinal: Positive for nausea and vomiting.   Genitourinary: Negative for dysuria.   Musculoskeletal: Negative for back pain.   Skin: Negative for rash.   Neurological: Positive for weakness (generalized) and headaches. Negative for dizziness, light-headedness and numbness.   Hematological: Does not bruise/bleed easily.       Physical Exam     Initial Vitals   BP Pulse Resp Temp SpO2   07/05/19 1239 07/05/19 1239 07/05/19 1239 07/05/19 1239 07/05/19 1445   (!) 146/95 95 18 98.4 °F (36.9 °C) 100 %      MAP       --                Physical Exam    Nursing note and vitals reviewed.  Constitutional: He appears well-developed and well-nourished.  Non-toxic appearance. He does not appear ill. No distress.   HENT:   Head: Normocephalic and atraumatic.   Eyes: Conjunctivae and EOM are normal. Pupils are equal, round, and reactive to light.   Neck: Normal range of motion. Neck supple.   Cardiovascular: Normal rate and regular rhythm. Exam reveals no gallop, no distant heart sounds and no friction rub.    No murmur heard.  Pulmonary/Chest: Effort normal and breath sounds normal. No accessory muscle usage. No tachypnea. No respiratory distress. He has no decreased breath sounds. He has no wheezes. He has no rhonchi. He has no rales.   Abdominal: He exhibits no distension.   Neurological: He is alert. He has normal strength. No sensory deficit.   No facial droop.  Speech is clear.   Skin: No rash noted.         ED Course   Procedures  Labs Reviewed - No data to display       Imaging Results    None          Medical Decision Making:   History:   Old Medical Records: I decided to obtain old medical records.  Differential  Diagnosis:   My differential diagnosis includes but is not limited to:  Migraine, cluster headache, tension headache, dehydration  ED Management:  39-year-old male with a history of migraine/cluster headaches presents to the ED for evaluation of headache. Patient states that this headache is similar to previous headaches that he has had in the past, but has not experienced in the several years.  Nonfocal neuro exam.  Patient received IV fluids and Reglan in the ED.  He reports improvement in his symptoms. Patient's symptoms likely due to his migraines versus dehydration from exposure to heat and alcohol use.  I will discharge patient in stable condition. I will provide a prescription for Fioricet.  Patient advised follow-up with his PCP or provider previously following his headaches.  ER return precautions given. I have reviewed the patient's records and discussed this case with my supervising physician.              Scribe Attestation:   Scribe #1: I performed the above scribed service and the documentation accurately describes the services I performed. I attest to the accuracy of the note.    Attending Attestation:     Physician Attestation Statement for NP/PA:   I discussed this assessment and plan of this patient with the NP/PA, but I did not personally examine the patient. The face to face encounter was performed by the NP/PA.                     Clinical Impression:       ICD-10-CM ICD-9-CM   1. Nonintractable headache, unspecified chronicity pattern, unspecified headache type R51 784.0         Disposition:   Disposition: Discharged  Condition: Stable                        Nuvia Lentz PA-C  07/05/19 1094

## 2019-07-05 NOTE — DISCHARGE INSTRUCTIONS
Follow up with your primary care provider or the provider that was following your headaches in the past for re-evaluation.  Return to the ER immediately for any new or worsening symptoms.

## 2019-08-23 ENCOUNTER — HOSPITAL ENCOUNTER (EMERGENCY)
Facility: HOSPITAL | Age: 39
Discharge: HOME | End: 2019-08-23
Attending: EMERGENCY MEDICINE
Payer: COMMERCIAL

## 2019-08-23 VITALS
OXYGEN SATURATION: 99 % | SYSTOLIC BLOOD PRESSURE: 146 MMHG | TEMPERATURE: 99 F | DIASTOLIC BLOOD PRESSURE: 103 MMHG | HEART RATE: 90 BPM | RESPIRATION RATE: 20 BRPM

## 2019-08-23 DIAGNOSIS — K13.21 LEUKOPLAKIA OF ORAL MUCOSA: Primary | ICD-10-CM

## 2019-08-23 PROCEDURE — 99282 EMERGENCY DEPT VISIT SF MDM: CPT | Mod: ER

## 2019-08-23 NOTE — ED PROVIDER NOTES
Encounter Date: 2019       History     Chief Complaint   Patient presents with    Mouth Lesions     I started with these bumps few days ago and they are in my mouth and they dont hurt but they get bigger then smaller      39-year-old male presents complaining of thickened mucosa with bumps to left lower lip that have been present for at least 2 weeks.  Patient denies tobacco use such as smoking or chewing tobacco.  Patient does admit to alcohol use.  Patient reports area is painless.  Patient denies fever/chills/nausea/vomiting/diarrhea.        Review of patient's allergies indicates:  No Known Allergies  Past Medical History:   Diagnosis Date    Cardiac murmur     Chlamydia infection     Cluster headaches     Costochondritis     Gout, chronic     Hypertension      Past Surgical History:   Procedure Laterality Date    HERNIA REPAIR       Family History   Problem Relation Age of Onset    Hypertension Mother     Heart disease Maternal Grandmother      Social History     Tobacco Use    Smoking status: Former Smoker     Last attempt to quit: 2001     Years since quittin.6    Smokeless tobacco: Never Used   Substance Use Topics    Alcohol use: Yes     Comment: occ beer    Drug use: No     Review of Systems   HENT: Positive for mouth sores.    All other systems reviewed and are negative.      Physical Exam     Initial Vitals [19 1801]   BP Pulse Resp Temp SpO2   (!) 152/108 (!) 115 20 99 °F (37.2 °C) 99 %      MAP       --         Physical Exam    Nursing note and vitals reviewed.  Constitutional: He appears well-developed and well-nourished.   HENT:   Head: Normocephalic and atraumatic.   Right Ear: External ear normal.   Left Ear: External ear normal.   Nose: Nose normal.   Mucosa of lower inner lip appears thickened with whitish haze   Eyes: Conjunctivae and EOM are normal. Pupils are equal, round, and reactive to light.   Neck: Normal range of motion. Neck supple.   Cardiovascular:  Normal rate, regular rhythm and normal heart sounds.   Pulmonary/Chest: Breath sounds normal.   Neurological: He is alert and oriented to person, place, and time. He has normal strength. GCS score is 15. GCS eye subscore is 4. GCS verbal subscore is 5. GCS motor subscore is 6.   Skin: Skin is warm. Capillary refill takes less than 2 seconds.         ED Course   Procedures  Labs Reviewed - No data to display       Imaging Results    None                               Clinical Impression:       ICD-10-CM ICD-9-CM   1. 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

## 2019-09-05 ENCOUNTER — TELEPHONE (OUTPATIENT)
Dept: OTOLARYNGOLOGY | Facility: CLINIC | Age: 39
End: 2019-09-05

## 2019-09-05 NOTE — TELEPHONE ENCOUNTER
----- Message from Loren Edgar sent at 8/28/2019  8:04 AM CDT -----  Second request    New patient no. [phone]  Patient was in the ER last week.   He has a bump inside his lip.  He was referred to Dr. Escobedo.   He would like an appointment on Thursday, 8/29/19.

## 2019-09-16 ENCOUNTER — OFFICE VISIT (OUTPATIENT)
Dept: OTOLARYNGOLOGY | Facility: CLINIC | Age: 39
End: 2019-09-16
Payer: COMMERCIAL

## 2019-09-16 VITALS
SYSTOLIC BLOOD PRESSURE: 142 MMHG | BODY MASS INDEX: 29.57 KG/M2 | HEIGHT: 70 IN | WEIGHT: 206.56 LBS | TEMPERATURE: 99 F | DIASTOLIC BLOOD PRESSURE: 98 MMHG | HEART RATE: 88 BPM

## 2019-09-16 DIAGNOSIS — K13.0 MUCOCELE OF LOWER LIP: Primary | ICD-10-CM

## 2019-09-16 PROCEDURE — 3077F SYST BP >= 140 MM HG: CPT | Mod: CPTII,S$GLB,, | Performed by: OTOLARYNGOLOGY

## 2019-09-16 PROCEDURE — 99999 PR PBB SHADOW E&M-EST. PATIENT-LVL III: ICD-10-PCS | Mod: PBBFAC,,, | Performed by: OTOLARYNGOLOGY

## 2019-09-16 PROCEDURE — 3008F BODY MASS INDEX DOCD: CPT | Mod: CPTII,S$GLB,, | Performed by: OTOLARYNGOLOGY

## 2019-09-16 PROCEDURE — 3077F PR MOST RECENT SYSTOLIC BLOOD PRESSURE >= 140 MM HG: ICD-10-PCS | Mod: CPTII,S$GLB,, | Performed by: OTOLARYNGOLOGY

## 2019-09-16 PROCEDURE — 99203 PR OFFICE/OUTPT VISIT, NEW, LEVL III, 30-44 MIN: ICD-10-PCS | Mod: S$GLB,,, | Performed by: OTOLARYNGOLOGY

## 2019-09-16 PROCEDURE — 3008F PR BODY MASS INDEX (BMI) DOCUMENTED: ICD-10-PCS | Mod: CPTII,S$GLB,, | Performed by: OTOLARYNGOLOGY

## 2019-09-16 PROCEDURE — 99203 OFFICE O/P NEW LOW 30 MIN: CPT | Mod: S$GLB,,, | Performed by: OTOLARYNGOLOGY

## 2019-09-16 PROCEDURE — 3080F PR MOST RECENT DIASTOLIC BLOOD PRESSURE >= 90 MM HG: ICD-10-PCS | Mod: CPTII,S$GLB,, | Performed by: OTOLARYNGOLOGY

## 2019-09-16 PROCEDURE — 99999 PR PBB SHADOW E&M-EST. PATIENT-LVL III: CPT | Mod: PBBFAC,,, | Performed by: OTOLARYNGOLOGY

## 2019-09-16 PROCEDURE — 3080F DIAST BP >= 90 MM HG: CPT | Mod: CPTII,S$GLB,, | Performed by: OTOLARYNGOLOGY

## 2019-09-16 NOTE — PROGRESS NOTES
Otolaryngology Clinic Note    Alli Lezama  Encounter Date: 9/16/2019   YOB: 1980  Referring Physician: Zaid Light Md  [address]  [address]  [place] [address]   PCP: Alessia Valera MD (Inactive)    Chief Complaint:   Chief Complaint   Patient presents with    Other     Patient went to the ER on 08/23/2019 for bump on lower lip.No Pain       HPI: Alli Lezama is a 39 y.o. male referred from the ED after being seen 8/23/19 with a bump on the inside of his lower lip. Patient reports he first noticed it about 3 weeks ago. It will grow and then go away but came back. It has gotten smaller today. No associated pain. No drainage. No tobacco use. Occasional alcohol use. Never had anything like this before    Review of Systems   Constitutional: Negative for chills, fever and weight loss.   HENT: Negative for ear pain, hearing loss and sore throat.    Eyes: Negative for blurred vision and double vision.   Respiratory: Negative for cough and shortness of breath.    Cardiovascular: Negative for chest pain and palpitations.   Gastrointestinal: Negative for nausea and vomiting.   Musculoskeletal: Negative for joint pain and neck pain.   Skin: Negative for rash.   Neurological: Negative for dizziness, focal weakness and headaches.   Psychiatric/Behavioral: Negative for depression. The patient is not nervous/anxious.         Review of patient's allergies indicates:  No Known Allergies    Past Medical History:   Diagnosis Date    Cardiac murmur     Chlamydia infection     Cluster headaches     Costochondritis     Gout, chronic     Hypertension        Past Surgical History:   Procedure Laterality Date    HERNIA REPAIR         Social History     Socioeconomic History    Marital status:      Spouse name: Not on file    Number of children: Not on file    Years of education: Not on file    Highest education level: Not on file   Occupational History    Not on file   Social  Needs    Financial resource strain: Not on file    Food insecurity:     Worry: Not on file     Inability: Not on file    Transportation needs:     Medical: Not on file     Non-medical: Not on file   Tobacco Use    Smoking status: Former Smoker     Last attempt to quit: 2001     Years since quittin.6    Smokeless tobacco: Never Used   Substance and Sexual Activity    Alcohol use: Yes     Comment: occ beer    Drug use: No    Sexual activity: Yes     Partners: Female   Lifestyle    Physical activity:     Days per week: Not on file     Minutes per session: Not on file    Stress: Not on file   Relationships    Social connections:     Talks on phone: Not on file     Gets together: Not on file     Attends Gnosticism service: Not on file     Active member of club or organization: Not on file     Attends meetings of clubs or organizations: Not on file     Relationship status: Not on file   Other Topics Concern    Not on file   Social History Narrative    Not on file       Family History   Problem Relation Age of Onset    Hypertension Mother     Heart disease Maternal Grandmother        No outpatient encounter medications on file as of 2019.     No facility-administered encounter medications on file as of 2019.        Physical Exam:    Vitals:    19 0812   BP: (!) 142/98   Pulse: 88   Temp: 99.2 °F (37.3 °C)       Constitutional  General Appearance: well nourished, well-developed, alert, oriented, in no acute distress  Communication: ability, understanding, voice quality normal  Head and Face  Inspection: normocephalic, atraumatic, no scars, lesions or masses    Palpation: no stepoffs, sinus tenderness or masses  Parotid glands: no masses, stones, swelling or tenderness  Eyes  Ocular Motility / Alignment: normal alignment, motility, no proptosis, enophthalmus or nystagmus  Conjunctiva: not injected  Eyelids: no entropion or ectropion, no edema  Ears  Hearing: speech reception thresholds  grossly normal  External Ears: no auricle lesions, non-tender, mobile to palpation  Otoscopy:  Right Ear: no tympanic membrane lesions, perforations, or effusion, normal EAC  Left Ear: no tympanic membrane lesions, perforations, or effusion, normal EAC  Nose  External Nose: no lesions, tenderness, trauma or deformity  Intranasal Exam: no edema, erythema, discharge, mass or obstruction  Oral Cavity / Oropharynx  Lips: upper and lower lips pink and moist  Teeth: dentition good  Gingiva: healthy  Oral Mucosa: moist, no mucosal lesions  Floor of Mouth: normal, no lesions, salivary ducts patent  Tongue: moist, normal mobility, no lesions  Palate: soft and hard palates without lesions or ulcers  Oropharynx: tonsils and walls without erythema, exudate, lesions, fullness or obstruction  Neck  Inspection and Palpation: no erythema, induration, emphysema, tenderness or masses  Larynx and Trachea: normal position and mobility   Thyroid: no tenderness, enlargement or nodules  Submandibular Glands: no masses or tenderness  Lymphatic:  Anterior, Posterior, Submandibular, Submental, Supraclavicular: no lymphadenopathy present  Chest / Respiratory  Chest: no stridor or retractions, normal effort and expansion  Cardiovascular:  Pulses: 2+ radial pulses, regular rhythm and rate  Auscultation: deferred  Neurological  Cranial Nerves: grossly intact  General: no focal deficits  Psychiatric  Orientation: oriented to time, place and person  Mood and Affect: no depression, anxiety or agitation  Extremities  Inspection: moves all extremities well    Procedures:  No notes on file    Pertinent Data:  ? LABS:   ? AUDIO:  ? PATH:      I personally reviewed the following pertinent data at today's visit:    Imaging:   ? XRAY:  ? Ultrasound:  ? CT Scan:  ? MRI Scan:  ? PET/CT Scan:    I personally reviewed the following images:    Summary of Outside Records:      Assessment and Plan:  Alli Lezama is a 39 y.o. male with     Mucocele of lower  lip       The bump on the patient's lower lip has essentially resolved. He has a palpable little pinpoint knot but has been biting at that spot. I explained with the location, description and history of coming and going it is most likely just a mucocele. If it recurs or gets really large we can always excise it. He will return in 4 weeks to ensure resolution        JO Escobedo MD  [facility]

## 2020-03-28 ENCOUNTER — HOSPITAL ENCOUNTER (EMERGENCY)
Facility: HOSPITAL | Age: 40
Discharge: HOME | End: 2020-03-28
Attending: FAMILY MEDICINE
Payer: COMMERCIAL

## 2020-03-28 VITALS
BODY MASS INDEX: 29.68 KG/M2 | HEART RATE: 100 BPM | TEMPERATURE: 98 F | HEIGHT: 71 IN | RESPIRATION RATE: 18 BRPM | OXYGEN SATURATION: 100 % | WEIGHT: 212 LBS | DIASTOLIC BLOOD PRESSURE: 96 MMHG | SYSTOLIC BLOOD PRESSURE: 151 MMHG

## 2020-03-28 DIAGNOSIS — M10.9 ACUTE GOUT OF LEFT FOOT, UNSPECIFIED CAUSE: ICD-10-CM

## 2020-03-28 DIAGNOSIS — M25.572 ACUTE LEFT ANKLE PAIN: Primary | ICD-10-CM

## 2020-03-28 PROCEDURE — 99284 EMERGENCY DEPT VISIT MOD MDM: CPT | Mod: ER

## 2020-03-28 PROCEDURE — 63600175 PHARM REV CODE 636 W HCPCS: Mod: ER | Performed by: FAMILY MEDICINE

## 2020-03-28 PROCEDURE — 25000003 PHARM REV CODE 250: Mod: ER | Performed by: FAMILY MEDICINE

## 2020-03-28 RX ORDER — INDOMETHACIN 50 MG/1
50 CAPSULE ORAL
Qty: 30 CAPSULE | Refills: 0 | Status: SHIPPED | OUTPATIENT
Start: 2020-03-28 | End: 2020-11-23 | Stop reason: CLARIF

## 2020-03-28 RX ORDER — PREDNISONE 20 MG/1
60 TABLET ORAL
Status: COMPLETED | OUTPATIENT
Start: 2020-03-28 | End: 2020-03-28

## 2020-03-28 RX ORDER — PREDNISONE 20 MG/1
40 TABLET ORAL DAILY
Qty: 10 TABLET | Refills: 0 | Status: SHIPPED | OUTPATIENT
Start: 2020-03-28 | End: 2020-04-02

## 2020-03-28 RX ORDER — KETOROLAC TROMETHAMINE 10 MG/1
10 TABLET, FILM COATED ORAL
Status: COMPLETED | OUTPATIENT
Start: 2020-03-28 | End: 2020-03-28

## 2020-03-28 RX ADMIN — PREDNISONE 60 MG: 20 TABLET ORAL at 10:03

## 2020-03-28 RX ADMIN — KETOROLAC TROMETHAMINE 10 MG: 10 TABLET, FILM COATED ORAL at 10:03

## 2020-03-28 NOTE — ED PROVIDER NOTES
Encounter Date: 3/28/2020       History     Chief Complaint   Patient presents with    Ankle Pain     Pt reports he woke up with L ankle pain this morning. Pt denies injury.      40-year-old male complains of left ankle pain for last 3 days.  Patient also complains of left great toe pain at MCP joint.  Patient took OTC pain medication and ankle brace.  Patient denies having gout but his chart says he has chronic gout.  He is not taking any medication for it.  Denies injury.    The history is provided by the patient.     Review of patient's allergies indicates:  No Known Allergies  Past Medical History:   Diagnosis Date    Cardiac murmur     Chlamydia infection     Cluster headaches     Costochondritis     Gout, chronic     Hypertension      Past Surgical History:   Procedure Laterality Date    HERNIA REPAIR       Family History   Problem Relation Age of Onset    Hypertension Mother     Heart disease Maternal Grandmother      Social History     Tobacco Use    Smoking status: Former Smoker     Last attempt to quit: 2001     Years since quittin.1    Smokeless tobacco: Never Used   Substance Use Topics    Alcohol use: Yes     Comment: occ beer    Drug use: No     Review of Systems   Constitutional: Negative for activity change, appetite change, chills and fever.   HENT: Negative for congestion, ear discharge, rhinorrhea, sinus pressure, sinus pain, sore throat and trouble swallowing.    Eyes: Negative for photophobia, pain, discharge, redness, itching and visual disturbance.   Respiratory: Negative for cough, chest tightness, shortness of breath and wheezing.    Cardiovascular: Negative for chest pain, palpitations and leg swelling.   Gastrointestinal: Negative for abdominal distention, abdominal pain, constipation, diarrhea, nausea and vomiting.   Genitourinary: Negative for dysuria, flank pain, frequency and hematuria.   Musculoskeletal: Positive for joint swelling. Negative for back pain, gait  problem, neck pain and neck stiffness.   Skin: Negative for rash and wound.   Neurological: Negative for dizziness, tremors, seizures, syncope, speech difficulty, weakness, light-headedness, numbness and headaches.   Psychiatric/Behavioral: Negative for behavioral problems, confusion, hallucinations and sleep disturbance. The patient is not nervous/anxious.    All other systems reviewed and are negative.      Physical Exam     Initial Vitals [03/28/20 1025]   BP Pulse Resp Temp SpO2   (!) 151/96 100 18 98.3 °F (36.8 °C) 100 %      MAP       --         Physical Exam    Nursing note and vitals reviewed.  Constitutional: Vital signs are normal. He appears well-developed and well-nourished. He is active.   HENT:   Head: Normocephalic and atraumatic.   Right Ear: Tympanic membrane normal.   Left Ear: Tympanic membrane normal.   Nose: Nose normal.   Mouth/Throat: Oropharynx is clear and moist.   Eyes: Conjunctivae and lids are normal.   Neck: Trachea normal, normal range of motion and full passive range of motion without pain. Neck supple. Normal range of motion present. No neck rigidity.   Cardiovascular: Normal rate, regular rhythm, S1 normal, S2 normal, normal heart sounds, intact distal pulses and normal pulses.   Pulmonary/Chest: Breath sounds normal. No respiratory distress. He has no wheezes. He has no rales.   Abdominal: Soft. Normal appearance and bowel sounds are normal. He exhibits no distension. There is no tenderness.   Musculoskeletal: Normal range of motion.        Feet:    Diffuse left ankle pain.  Left 1st MCP joint pain.   Lymphadenopathy:     He has no cervical adenopathy.   Neurological: He is alert and oriented to person, place, and time. He has normal strength and normal reflexes. No cranial nerve deficit or sensory deficit. GCS score is 15. GCS eye subscore is 4. GCS verbal subscore is 5. GCS motor subscore is 6.   Skin: Skin is warm and intact. Capillary refill takes less than 2 seconds. No  abrasion, no bruising and no rash noted.   Psychiatric: He has a normal mood and affect. His speech is normal and behavior is normal. Judgment and thought content normal. He is not actively hallucinating. Cognition and memory are normal. He is attentive.         ED Course   Procedures  Labs Reviewed - No data to display       Imaging Results    None          Medical Decision Making:   ED Management:  Patient pain and symptoms are consistent with gout.  Patient is given Toradol and prednisone in ER.   prescribed Indocin and prednisone.  Advised to follow up with primary care physician for further evaluation of his gout and may need more medication to control chronic symptoms.  Follow-up ED with any worsening pain or swelling.                                 Clinical Impression:       ICD-10-CM ICD-9-CM   1. Acute left ankle pain M25.572 719.47   2. Acute gout of left foot, unspecified cause M10.9 274.01         Disposition:   Disposition: Discharged  Condition: Stable                        Krzysztof Ulrich MD  03/28/20 8886

## 2020-12-10 ENCOUNTER — OFFICE VISIT (OUTPATIENT)
Dept: FAMILY MEDICINE | Facility: CLINIC | Age: 40
End: 2020-12-10
Payer: COMMERCIAL

## 2020-12-10 ENCOUNTER — LAB VISIT (OUTPATIENT)
Dept: LAB | Facility: HOSPITAL | Age: 40
End: 2020-12-10
Attending: FAMILY MEDICINE
Payer: COMMERCIAL

## 2020-12-10 VITALS
TEMPERATURE: 97 F | DIASTOLIC BLOOD PRESSURE: 100 MMHG | HEIGHT: 70 IN | WEIGHT: 206.81 LBS | SYSTOLIC BLOOD PRESSURE: 132 MMHG | BODY MASS INDEX: 29.61 KG/M2 | OXYGEN SATURATION: 98 % | HEART RATE: 97 BPM

## 2020-12-10 DIAGNOSIS — Z76.89 ENCOUNTER TO ESTABLISH CARE WITH NEW DOCTOR: ICD-10-CM

## 2020-12-10 DIAGNOSIS — I51.7 LAE (LEFT ATRIAL ENLARGEMENT): ICD-10-CM

## 2020-12-10 DIAGNOSIS — Z80.42 FAMILY HISTORY OF PROSTATE CANCER IN FATHER: ICD-10-CM

## 2020-12-10 DIAGNOSIS — Z00.00 VISIT FOR WELL MAN HEALTH CHECK: ICD-10-CM

## 2020-12-10 DIAGNOSIS — I10 ESSENTIAL HYPERTENSION: ICD-10-CM

## 2020-12-10 DIAGNOSIS — I10 UNCONTROLLED HYPERTENSION: ICD-10-CM

## 2020-12-10 DIAGNOSIS — Z23 NEED FOR DIPHTHERIA-TETANUS-PERTUSSIS (TDAP) VACCINE: ICD-10-CM

## 2020-12-10 DIAGNOSIS — Z87.39 HISTORY OF GOUT: ICD-10-CM

## 2020-12-10 DIAGNOSIS — Z00.00 VISIT FOR WELL MAN HEALTH CHECK: Primary | ICD-10-CM

## 2020-12-10 PROBLEM — A74.9 CHLAMYDIA: Status: RESOLVED | Noted: 2018-06-07 | Resolved: 2020-12-10

## 2020-12-10 LAB
ALBUMIN SERPL BCP-MCNC: 4.1 G/DL (ref 3.5–5.2)
ALP SERPL-CCNC: 46 U/L (ref 55–135)
ALT SERPL W/O P-5'-P-CCNC: 15 U/L (ref 10–44)
ANION GAP SERPL CALC-SCNC: 9 MMOL/L (ref 8–16)
AST SERPL-CCNC: 17 U/L (ref 10–40)
BASOPHILS # BLD AUTO: 0.04 K/UL (ref 0–0.2)
BASOPHILS NFR BLD: 1.2 % (ref 0–1.9)
BILIRUB SERPL-MCNC: 0.3 MG/DL (ref 0.1–1)
BUN SERPL-MCNC: 9 MG/DL (ref 6–20)
CALCIUM SERPL-MCNC: 9.2 MG/DL (ref 8.7–10.5)
CHLORIDE SERPL-SCNC: 104 MMOL/L (ref 95–110)
CHOLEST SERPL-MCNC: 187 MG/DL (ref 120–199)
CHOLEST/HDLC SERPL: 3.7 {RATIO} (ref 2–5)
CO2 SERPL-SCNC: 29 MMOL/L (ref 23–29)
CREAT SERPL-MCNC: 1.3 MG/DL (ref 0.5–1.4)
DIFFERENTIAL METHOD: ABNORMAL
EOSINOPHIL # BLD AUTO: 0.2 K/UL (ref 0–0.5)
EOSINOPHIL NFR BLD: 4.7 % (ref 0–8)
ERYTHROCYTE [DISTWIDTH] IN BLOOD BY AUTOMATED COUNT: 15 % (ref 11.5–14.5)
EST. GFR  (AFRICAN AMERICAN): >60 ML/MIN/1.73 M^2
EST. GFR  (NON AFRICAN AMERICAN): >60 ML/MIN/1.73 M^2
GLUCOSE SERPL-MCNC: 88 MG/DL (ref 70–110)
HCT VFR BLD AUTO: 46.9 % (ref 40–54)
HDLC SERPL-MCNC: 50 MG/DL (ref 40–75)
HDLC SERPL: 26.7 % (ref 20–50)
HGB BLD-MCNC: 14.4 G/DL (ref 14–18)
IMM GRANULOCYTES # BLD AUTO: 0.01 K/UL (ref 0–0.04)
IMM GRANULOCYTES NFR BLD AUTO: 0.3 % (ref 0–0.5)
LDLC SERPL CALC-MCNC: 113.6 MG/DL (ref 63–159)
LYMPHOCYTES # BLD AUTO: 1.3 K/UL (ref 1–4.8)
LYMPHOCYTES NFR BLD: 37.4 % (ref 18–48)
MCH RBC QN AUTO: 22.5 PG (ref 27–31)
MCHC RBC AUTO-ENTMCNC: 30.7 G/DL (ref 32–36)
MCV RBC AUTO: 73 FL (ref 82–98)
MONOCYTES # BLD AUTO: 0.5 K/UL (ref 0.3–1)
MONOCYTES NFR BLD: 15.8 % (ref 4–15)
NEUTROPHILS # BLD AUTO: 1.4 K/UL (ref 1.8–7.7)
NEUTROPHILS NFR BLD: 40.6 % (ref 38–73)
NONHDLC SERPL-MCNC: 137 MG/DL
NRBC BLD-RTO: 0 /100 WBC
PLATELET # BLD AUTO: 314 K/UL (ref 150–350)
PMV BLD AUTO: 12.1 FL (ref 9.2–12.9)
POTASSIUM SERPL-SCNC: 4.4 MMOL/L (ref 3.5–5.1)
PROT SERPL-MCNC: 8 G/DL (ref 6–8.4)
RBC # BLD AUTO: 6.39 M/UL (ref 4.6–6.2)
SODIUM SERPL-SCNC: 142 MMOL/L (ref 136–145)
TRIGL SERPL-MCNC: 117 MG/DL (ref 30–150)
TSH SERPL DL<=0.005 MIU/L-ACNC: 1.18 UIU/ML (ref 0.4–4)
URATE SERPL-MCNC: 7.9 MG/DL (ref 3.4–7)
WBC # BLD AUTO: 3.42 K/UL (ref 3.9–12.7)

## 2020-12-10 PROCEDURE — 1126F AMNT PAIN NOTED NONE PRSNT: CPT | Mod: S$GLB,,, | Performed by: FAMILY MEDICINE

## 2020-12-10 PROCEDURE — 93010 ELECTROCARDIOGRAM REPORT: CPT | Mod: S$GLB,,, | Performed by: INTERNAL MEDICINE

## 2020-12-10 PROCEDURE — 3080F PR MOST RECENT DIASTOLIC BLOOD PRESSURE >= 90 MM HG: ICD-10-PCS | Mod: CPTII,S$GLB,, | Performed by: FAMILY MEDICINE

## 2020-12-10 PROCEDURE — 84443 ASSAY THYROID STIM HORMONE: CPT

## 2020-12-10 PROCEDURE — 99395 PREV VISIT EST AGE 18-39: CPT | Mod: 25,S$GLB,, | Performed by: FAMILY MEDICINE

## 2020-12-10 PROCEDURE — 93010 EKG 12-LEAD: ICD-10-PCS | Mod: S$GLB,,, | Performed by: INTERNAL MEDICINE

## 2020-12-10 PROCEDURE — 86592 SYPHILIS TEST NON-TREP QUAL: CPT

## 2020-12-10 PROCEDURE — 99395 PR PREVENTIVE VISIT,EST,18-39: ICD-10-PCS | Mod: 25,S$GLB,, | Performed by: FAMILY MEDICINE

## 2020-12-10 PROCEDURE — 86703 HIV-1/HIV-2 1 RESULT ANTBDY: CPT

## 2020-12-10 PROCEDURE — 90471 IMMUNIZATION ADMIN: CPT | Mod: S$GLB,,, | Performed by: FAMILY MEDICINE

## 2020-12-10 PROCEDURE — 85025 COMPLETE CBC W/AUTO DIFF WBC: CPT

## 2020-12-10 PROCEDURE — 3008F BODY MASS INDEX DOCD: CPT | Mod: CPTII,S$GLB,, | Performed by: FAMILY MEDICINE

## 2020-12-10 PROCEDURE — 1126F PR PAIN SEVERITY QUANTIFIED, NO PAIN PRESENT: ICD-10-PCS | Mod: S$GLB,,, | Performed by: FAMILY MEDICINE

## 2020-12-10 PROCEDURE — 90715 TDAP VACCINE 7 YRS/> IM: CPT | Mod: S$GLB,,, | Performed by: FAMILY MEDICINE

## 2020-12-10 PROCEDURE — 82306 VITAMIN D 25 HYDROXY: CPT

## 2020-12-10 PROCEDURE — 3075F SYST BP GE 130 - 139MM HG: CPT | Mod: CPTII,S$GLB,, | Performed by: FAMILY MEDICINE

## 2020-12-10 PROCEDURE — 99999 PR PBB SHADOW E&M-EST. PATIENT-LVL III: CPT | Mod: PBBFAC,,, | Performed by: FAMILY MEDICINE

## 2020-12-10 PROCEDURE — 90715 TDAP VACCINE GREATER THAN OR EQUAL TO 7YO IM: ICD-10-PCS | Mod: S$GLB,,, | Performed by: FAMILY MEDICINE

## 2020-12-10 PROCEDURE — 90471 TDAP VACCINE GREATER THAN OR EQUAL TO 7YO IM: ICD-10-PCS | Mod: S$GLB,,, | Performed by: FAMILY MEDICINE

## 2020-12-10 PROCEDURE — 84550 ASSAY OF BLOOD/URIC ACID: CPT

## 2020-12-10 PROCEDURE — 84153 ASSAY OF PSA TOTAL: CPT

## 2020-12-10 PROCEDURE — 3080F DIAST BP >= 90 MM HG: CPT | Mod: CPTII,S$GLB,, | Performed by: FAMILY MEDICINE

## 2020-12-10 PROCEDURE — 99999 PR PBB SHADOW E&M-EST. PATIENT-LVL III: ICD-10-PCS | Mod: PBBFAC,,, | Performed by: FAMILY MEDICINE

## 2020-12-10 PROCEDURE — 83036 HEMOGLOBIN GLYCOSYLATED A1C: CPT

## 2020-12-10 PROCEDURE — 80061 LIPID PANEL: CPT

## 2020-12-10 PROCEDURE — 3075F PR MOST RECENT SYSTOLIC BLOOD PRESS GE 130-139MM HG: ICD-10-PCS | Mod: CPTII,S$GLB,, | Performed by: FAMILY MEDICINE

## 2020-12-10 PROCEDURE — 3008F PR BODY MASS INDEX (BMI) DOCUMENTED: ICD-10-PCS | Mod: CPTII,S$GLB,, | Performed by: FAMILY MEDICINE

## 2020-12-10 PROCEDURE — 80053 COMPREHEN METABOLIC PANEL: CPT

## 2020-12-10 PROCEDURE — 93005 ELECTROCARDIOGRAM TRACING: CPT | Mod: S$GLB,,, | Performed by: FAMILY MEDICINE

## 2020-12-10 PROCEDURE — 93005 EKG 12-LEAD: ICD-10-PCS | Mod: S$GLB,,, | Performed by: FAMILY MEDICINE

## 2020-12-10 PROCEDURE — 36415 COLL VENOUS BLD VENIPUNCTURE: CPT | Mod: PO

## 2020-12-10 RX ORDER — AMLODIPINE BESYLATE 5 MG/1
5 TABLET ORAL DAILY
Qty: 30 TABLET | Refills: 0 | Status: SHIPPED | OUTPATIENT
Start: 2020-12-10 | End: 2021-01-07 | Stop reason: SDUPTHER

## 2020-12-10 NOTE — PATIENT INSTRUCTIONS
?Avoid tobacco  ?Be physically active  ?Maintain a healthy weight  ?Eat a diet rich in fruits, vegetables, and whole grains, and low in saturated/trans fat  ?Limit alcohol consumption  ?Protect against sexually transmitted infections  ?Avoid excess sun    BP high on repeat, similar to initial  LAE seen on EKG, ECHO ordered  High in EMR for >1 year  Cut back on beer, no more then 10/week  No other beverages besides water  Start amlodipine  Increase exercise    Family history of prostate cancer, check PSA EOY until 50 then annually? Dad had cancer at 58/59

## 2020-12-10 NOTE — PROGRESS NOTES
Subjective:       Patient ID: Alli Lezama is a 40 y.o. male.    Chief Complaint: Establish Care      Alli Lezama is a 40 y.o. male who presents today to establish care.     Diet: he eats out for lunch. He eats red beans, not a lot of fast food. Wife eats dinner, red beans, steak. No fried food. Soda rarely. Mostly green tea. Powerade.   Exercise: he doesn't exercise.     Dad has prostate cancer, not colon cancer. Called during visit to confirm.     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 He lives with his wife (Kavitha). There are no kids at home. No pets at home. No smokers at home. Wife is here today.     Review of Systems   Constitutional: Negative for chills and fever.   Eyes: Positive for visual disturbance.   Respiratory: Negative for cough and shortness of breath.    Cardiovascular: Negative for chest pain.   Gastrointestinal: Negative for diarrhea, nausea and vomiting.   Neurological: Negative for dizziness, syncope and light-headedness.         Health Maintenance Due   Topic Date Due    TETANUS VACCINE  01/23/1999    Influenza Vaccine (1) 08/01/2020         Objective:     Vitals:    12/10/20 1042   BP: (!) 132/100   Pulse: 97   Temp: 97.2 °F (36.2 °C)        Physical Exam  Vitals signs and nursing note reviewed.   Constitutional:       Appearance: He is well-developed. He is obese.   HENT:      Head: Normocephalic and atraumatic.   Eyes:      Conjunctiva/sclera: Conjunctivae normal.   Neck:      Musculoskeletal: Normal range of motion and neck supple.   Cardiovascular:      Rate and Rhythm: Normal rate and regular rhythm.   Pulmonary:      Effort: Pulmonary effort is normal.      Breath sounds: Normal breath sounds.   Abdominal:      Palpations: Abdomen is soft.      Tenderness: There is no abdominal tenderness.   Skin:      Findings: No rash.   Neurological:      Mental Status: He is alert and oriented to person, place, and time.   Psychiatric:         Behavior: Behavior normal.         Thought Content: Thought content normal.         Judgment: Judgment normal.         Assessment:       1. Visit for [facility] health check    2. Encounter to establish care with new doctor    3. Uncontrolled hypertension    4. Essential hypertension    5. History of gout    6. Family history of prostate cancer in father    7. Need for diphtheria-tetanus-pertussis (Tdap) vaccine    8. LAE (left atrial enlargement)        Plan:       ?Avoid tobacco  ?Be physically active  ?Maintain a healthy weight  ?Eat a diet rich in fruits, vegetables, and whole grains, and low in saturated/trans fat  ?Limit alcohol consumption  ?Protect against sexually transmitted infections  ?Avoid excess sun    BP high on repeat, similar to initial  LAE seen on EKG, ECHO ordered  High in EMR for >1 year  Cut back on beer, no more then 10/week  No other beverages besides water  Start amlodipine  Increase exercise    Family history of prostate cancer, check PSA EOY until 50 then annually? Dad had cancer at 58/59    Desires STI screen.     Visit for [facility] health check  -     CBC Auto Differential; Future; Expected date: 12/10/2020  -     Comprehensive Metabolic Panel; Future; Expected date: 12/10/2020  -     Hemoglobin A1C; Future; Expected date: 12/10/2020  -     Lipid Panel; Future; Expected date: 12/10/2020  -     TSH; Future; Expected date: 12/10/2020  -     Vitamin D; Future; Expected date: 12/10/2020  -     Urine culture; Future; Expected date: 12/10/2020  -     Urinalysis; Future; Expected date: 12/10/2020  -     IN OFFICE EKG 12-LEAD (to [facility])  -     Uric Acid; Future; Expected date: 12/10/2020  -     PSA, Screening; Future; Expected date: 12/10/2020  -     HIV 1/2 Ag/Ab (4th Gen); Future; Expected date: 12/10/2020  -     RPR; Future; Expected date: 12/10/2020    Encounter to  establish care with new doctor    Uncontrolled hypertension  -     IN OFFICE EKG 12-LEAD (to [facility])  -     amLODIPine (NORVASC) 5 MG tablet; Take 1 tablet (5 mg total) by mouth once daily.  Dispense: 30 tablet; Refill: 0    Essential hypertension  -     IN OFFICE EKG 12-LEAD (to [facility])  -     amLODIPine (NORVASC) 5 MG tablet; Take 1 tablet (5 mg total) by mouth once daily.  Dispense: 30 tablet; Refill: 0    History of gout  -     Uric Acid; Future; Expected date: 12/10/2020    Family history of prostate cancer in father  -     PSA, Screening; Future; Expected date: 12/10/2020    Need for diphtheria-tetanus-pertussis (Tdap) vaccine  -     Tdap Vaccine    LAE (left atrial enlargement)  -     Echo Color Flow Doppler? Yes; Future      Warning signs discussed, patient to call with any further issues or worsening of symptoms.

## 2020-12-11 ENCOUNTER — PATIENT MESSAGE (OUTPATIENT)
Dept: FAMILY MEDICINE | Facility: CLINIC | Age: 40
End: 2020-12-11

## 2020-12-11 LAB
25(OH)D3+25(OH)D2 SERPL-MCNC: 29 NG/ML (ref 30–96)
COMPLEXED PSA SERPL-MCNC: 0.88 NG/ML (ref 0–4)
ESTIMATED AVG GLUCOSE: 103 MG/DL (ref 68–131)
HBA1C MFR BLD HPLC: 5.2 % (ref 4–5.6)
HIV 1+2 AB+HIV1 P24 AG SERPL QL IA: NEGATIVE

## 2020-12-14 LAB — RPR SER QL: NORMAL

## 2020-12-18 ENCOUNTER — TELEPHONE (OUTPATIENT)
Dept: FAMILY MEDICINE | Facility: CLINIC | Age: 40
End: 2020-12-18

## 2020-12-18 ENCOUNTER — HOSPITAL ENCOUNTER (OUTPATIENT)
Dept: CARDIOLOGY | Facility: HOSPITAL | Age: 40
Discharge: HOME | End: 2020-12-18
Attending: FAMILY MEDICINE
Payer: COMMERCIAL

## 2020-12-18 VITALS — BODY MASS INDEX: 29.49 KG/M2 | HEIGHT: 70 IN | WEIGHT: 206 LBS

## 2020-12-18 DIAGNOSIS — Q24.5: Primary | ICD-10-CM

## 2020-12-18 DIAGNOSIS — I51.7 LAE (LEFT ATRIAL ENLARGEMENT): ICD-10-CM

## 2020-12-18 LAB
AORTIC ROOT ANNULUS: 3.07 CM
AORTIC VALVE CUSP SEPERATION: 2.14 CM
AV INDEX (PROSTH): 0.76
AV MEAN GRADIENT: 5 MMHG
AV PEAK GRADIENT: 8 MMHG
AV VALVE AREA: 3.03 CM2
AV VELOCITY RATIO: 0.76
BSA FOR ECHO PROCEDURE: 2.15 M2
CV ECHO LV RWT: 0.42 CM
DOP CALC AO PEAK VEL: 1.4 M/S
DOP CALC AO VTI: 27.21 CM
DOP CALC LVOT AREA: 4 CM2
DOP CALC LVOT DIAMETER: 2.25 CM
DOP CALC LVOT PEAK VEL: 1.07 M/S
DOP CALC LVOT STROKE VOLUME: 82.54 CM3
DOP CALCLVOT PEAK VEL VTI: 20.77 CM
E WAVE DECELERATION TIME: 137.76 MSEC
E/A RATIO: 1.27
E/E' RATIO: 9.88 M/S
ECHO LV POSTERIOR WALL: 1.03 CM (ref 0.6–1.1)
FRACTIONAL SHORTENING: 35 % (ref 28–44)
INTERVENTRICULAR SEPTUM: 1.04 CM (ref 0.6–1.1)
IVC PROX: 1 CM
IVRT: 69.2 MSEC
LA MAJOR: 4.24 CM
LA MINOR: 4.77 CM
LA WIDTH: 3.8 CM
LEFT ATRIUM SIZE: 3.3 CM
LEFT ATRIUM VOLUME INDEX MOD: 11.3 ML/M2
LEFT ATRIUM VOLUME INDEX: 22.6 ML/M2
LEFT ATRIUM VOLUME MOD: 23.91 CM3
LEFT ATRIUM VOLUME: 47.85 CM3
LEFT INTERNAL DIMENSION IN SYSTOLE: 3.16 CM (ref 2.1–4)
LEFT VENTRICLE DIASTOLIC VOLUME INDEX: 52.51 ML/M2
LEFT VENTRICLE DIASTOLIC VOLUME: 111 ML
LEFT VENTRICLE MASS INDEX: 86 G/M2
LEFT VENTRICLE SYSTOLIC VOLUME INDEX: 18.7 ML/M2
LEFT VENTRICLE SYSTOLIC VOLUME: 39.61 ML
LEFT VENTRICULAR INTERNAL DIMENSION IN DIASTOLE: 4.87 CM (ref 3.5–6)
LEFT VENTRICULAR MASS: 182.6 G
LV LATERAL E/E' RATIO: 7.64 M/S
LV SEPTAL E/E' RATIO: 14 M/S
MV A" WAVE DURATION": 14.19 MSEC
MV PEAK A VEL: 0.66 M/S
MV PEAK E VEL: 0.84 M/S
PISA MRMAX VEL: 0.06 M/S
PISA TR MAX VEL: 2.02 M/S
PULM VEIN S/D RATIO: 0.96
PV PEAK D VEL: 0.56 M/S
PV PEAK S VEL: 0.54 M/S
PV PEAK VELOCITY: 1.03 CM/S
RA MAJOR: 3.7 CM
RA PRESSURE: 3 MMHG
RA WIDTH: 3.53 CM
RIGHT VENTRICULAR END-DIASTOLIC DIMENSION: 2.4 CM
RV TISSUE DOPPLER FREE WALL SYSTOLIC VELOCITY 1 (APICAL 4 CHAMBER VIEW): 15.34 CM/S
SINUS: 2.78 CM
TDI LATERAL: 0.11 M/S
TDI SEPTAL: 0.06 M/S
TDI: 0.09 M/S
TR MAX PG: 16 MMHG
TRICUSPID ANNULAR PLANE SYSTOLIC EXCURSION: 2.15 CM
TV REST PULMONARY ARTERY PRESSURE: 19 MMHG

## 2020-12-18 PROCEDURE — 93306 TTE W/DOPPLER COMPLETE: CPT | Mod: 26,,, | Performed by: INTERNAL MEDICINE

## 2020-12-18 PROCEDURE — 93306 ECHO (CUPID ONLY): ICD-10-PCS | Mod: 26,,, | Performed by: INTERNAL MEDICINE

## 2020-12-18 PROCEDURE — 93306 TTE W/DOPPLER COMPLETE: CPT | Mod: PO

## 2020-12-21 ENCOUNTER — PATIENT MESSAGE (OUTPATIENT)
Dept: FAMILY MEDICINE | Facility: CLINIC | Age: 40
End: 2020-12-21

## 2020-12-21 ENCOUNTER — TELEPHONE (OUTPATIENT)
Dept: FAMILY MEDICINE | Facility: CLINIC | Age: 40
End: 2020-12-21

## 2020-12-21 NOTE — TELEPHONE ENCOUNTER
----- Message from Joshua Polk DO sent at 12/21/2020  9:13 AM CST -----  Please apologize    I thought I released the results with a message but it must have deleted    His ECHO showed some enlargement of an artery in the heart.     This is not life threatening but I would like him to have cardiology evaluation     Thanks    NEREYDA   ----- Message -----  From: Domi Kenny  Sent: 12/21/2020   7:41 AM CST  To: Louise Zamora MA, Joshua Polk DO    Good Morning,  I spoke with the patient to schedule an appointment with Dr. Mancia but the patient was no aware of the referral and does not know why he is being sent to her. I did not see any notes to read to him that could explain the referral. Can you contact him? I told him I would call him back and get him scheduled.    Thanks

## 2020-12-21 NOTE — TELEPHONE ENCOUNTER
I spoke with patient and informed him that ECHO showed some enlargement of an artery in the heart.   I advised patient this not life threatening but  would like him to have cardiology evaluation him. Patient voiced understanding.

## 2020-12-29 ENCOUNTER — OFFICE VISIT (OUTPATIENT)
Dept: CARDIOLOGY | Facility: CLINIC | Age: 40
End: 2020-12-29
Payer: COMMERCIAL

## 2020-12-29 VITALS
BODY MASS INDEX: 28.98 KG/M2 | HEART RATE: 82 BPM | WEIGHT: 202 LBS | SYSTOLIC BLOOD PRESSURE: 140 MMHG | DIASTOLIC BLOOD PRESSURE: 95 MMHG | OXYGEN SATURATION: 97 %

## 2020-12-29 DIAGNOSIS — Q24.5: Primary | ICD-10-CM

## 2020-12-29 DIAGNOSIS — I10 ESSENTIAL HYPERTENSION: ICD-10-CM

## 2020-12-29 PROCEDURE — 99213 PR OFFICE/OUTPT VISIT, EST, LEVL III, 20-29 MIN: ICD-10-PCS | Mod: S$GLB,,, | Performed by: INTERNAL MEDICINE

## 2020-12-29 PROCEDURE — 99213 OFFICE O/P EST LOW 20 MIN: CPT | Mod: S$GLB,,, | Performed by: INTERNAL MEDICINE

## 2020-12-29 PROCEDURE — 3077F SYST BP >= 140 MM HG: CPT | Mod: CPTII,S$GLB,, | Performed by: INTERNAL MEDICINE

## 2020-12-29 PROCEDURE — 99999 PR PBB SHADOW E&M-EST. PATIENT-LVL III: ICD-10-PCS | Mod: PBBFAC,,, | Performed by: INTERNAL MEDICINE

## 2020-12-29 PROCEDURE — 3008F BODY MASS INDEX DOCD: CPT | Mod: CPTII,S$GLB,, | Performed by: INTERNAL MEDICINE

## 2020-12-29 PROCEDURE — 99999 PR PBB SHADOW E&M-EST. PATIENT-LVL III: CPT | Mod: PBBFAC,,, | Performed by: INTERNAL MEDICINE

## 2020-12-29 PROCEDURE — 3077F PR MOST RECENT SYSTOLIC BLOOD PRESSURE >= 140 MM HG: ICD-10-PCS | Mod: CPTII,S$GLB,, | Performed by: INTERNAL MEDICINE

## 2020-12-29 PROCEDURE — 3080F DIAST BP >= 90 MM HG: CPT | Mod: CPTII,S$GLB,, | Performed by: INTERNAL MEDICINE

## 2020-12-29 PROCEDURE — 3008F PR BODY MASS INDEX (BMI) DOCUMENTED: ICD-10-PCS | Mod: CPTII,S$GLB,, | Performed by: INTERNAL MEDICINE

## 2020-12-29 PROCEDURE — 3080F PR MOST RECENT DIASTOLIC BLOOD PRESSURE >= 90 MM HG: ICD-10-PCS | Mod: CPTII,S$GLB,, | Performed by: INTERNAL MEDICINE

## 2020-12-29 NOTE — PROGRESS NOTES
Cardiology Clinic note    Subjective:   Patient ID:  Alli Lezama is a 40 y.o. male who has been referred by Dr Polk for echo finding of dilated coronary sinus    HPI:   Dilated coronary sinus:  No PICKETT, CP, orthopnea, PND, leg swelling.    HTN: On medications    LAE: Not noted on last echo    SH Tobacco Never used. 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   Modified Medications    No medications on file   Discontinued Medications    No medications on file        Review of Systems   Constitution: Negative for fever.   HENT: Negative for nosebleeds.    Cardiovascular: Negative for chest pain, dyspnea on exertion, irregular heartbeat, leg swelling, near-syncope, orthopnea, palpitations, paroxysmal nocturnal dyspnea and syncope.        As above   Respiratory: Negative for hemoptysis.    Hematologic/Lymphatic: Negative for bleeding problem.   Musculoskeletal: Negative for arthritis.   Gastrointestinal: Negative for hematochezia.   Genitourinary: Negative for hematuria.   Neurological: Negative for seizures.   Allergic/Immunologic: Positive for environmental allergies.         Objective:   Vitals  Vitals:    12/29/20 1131   BP: (!) 140/95   Pulse: 82   SpO2: 97%   Weight: 91.6 kg (202 lb)          Physical Exam   Constitutional: He appears well-developed. No distress.   Eyes: Conjunctivae are normal.   Neck: Normal range of motion. No JVD present.   Cardiovascular: Normal rate and  "regular rhythm. Exam reveals no gallop and no friction rub.   No murmur heard.  Pulmonary/Chest: Effort normal and breath sounds normal. No respiratory distress.   Abdominal: Soft. Bowel sounds are normal. There is no abdominal tenderness.   Musculoskeletal:         General: No edema.   Neurological: He is alert.   Skin: Skin is warm. He is not diaphoretic.           Assessment:     1. Abnormal coronary sinus    2. Essential hypertension        Plan:   Alli Lezama is a 40 y.o. male h/o HTN  Gout    EKG personally reviewed. My interpretation:  12/10/20: NSR 80s, normal axis. Possible septal infarct  Echo Dec 2020: LVEF 65%, normal diastology. Normal RVSF. Dilated coronary sinus - elevated RAP vs left SVC. Mild MA. CVP 3, PASP 19    Dilated coronary sinus  Not noted on prior echo      HTN  BP not well controlled - reports high when he comes to 's office  Will call in 1 week with home BP readings  Amlodipine    Risk and Functional Assessment  Lab Results   Component Value Date    LDLCALC 113.6 12/10/2020   Estimated 10 year ASCVD risk (age adjusted 40 y): 6% (Borderline)    Estimated body mass index is 28.98 kg/m² as calculated from the following:    Height as of 12/18/20: 5' 10" (1.778 m).    Weight as of this encounter: 91.6 kg (202 lb).    Continue with current medical plan and lifestyle changes.    No orders of the defined types were placed in this encounter.      Follow up as scheduled  Return sooner for concerns or questions. If symptoms persist go to the ED    He expressed verbal understanding and agreed with the plan      Rosalind Mancia MD  Interventional Cardiology  [facility]  Phone: [phone]    "

## 2021-01-07 ENCOUNTER — OFFICE VISIT (OUTPATIENT)
Dept: FAMILY MEDICINE | Facility: CLINIC | Age: 41
End: 2021-01-07
Payer: COMMERCIAL

## 2021-01-07 VITALS
DIASTOLIC BLOOD PRESSURE: 88 MMHG | WEIGHT: 205 LBS | SYSTOLIC BLOOD PRESSURE: 128 MMHG | HEART RATE: 92 BPM | OXYGEN SATURATION: 98 % | TEMPERATURE: 97 F | BODY MASS INDEX: 29.35 KG/M2 | HEIGHT: 70 IN

## 2021-01-07 DIAGNOSIS — Z87.39 HISTORY OF GOUT: ICD-10-CM

## 2021-01-07 DIAGNOSIS — R71.8 MICROCYTOSIS: ICD-10-CM

## 2021-01-07 DIAGNOSIS — Q24.5: ICD-10-CM

## 2021-01-07 DIAGNOSIS — I10 ESSENTIAL HYPERTENSION: Primary | ICD-10-CM

## 2021-01-07 DIAGNOSIS — I51.7 LAE (LEFT ATRIAL ENLARGEMENT): ICD-10-CM

## 2021-01-07 DIAGNOSIS — E79.0 ELEVATED URIC ACID IN BLOOD: ICD-10-CM

## 2021-01-07 PROCEDURE — 99214 OFFICE O/P EST MOD 30 MIN: CPT | Mod: S$GLB,,, | Performed by: FAMILY MEDICINE

## 2021-01-07 PROCEDURE — 3079F PR MOST RECENT DIASTOLIC BLOOD PRESSURE 80-89 MM HG: ICD-10-PCS | Mod: CPTII,S$GLB,, | Performed by: FAMILY MEDICINE

## 2021-01-07 PROCEDURE — 99999 PR PBB SHADOW E&M-EST. PATIENT-LVL III: ICD-10-PCS | Mod: PBBFAC,,, | Performed by: FAMILY MEDICINE

## 2021-01-07 PROCEDURE — 3008F BODY MASS INDEX DOCD: CPT | Mod: CPTII,S$GLB,, | Performed by: FAMILY MEDICINE

## 2021-01-07 PROCEDURE — 3074F SYST BP LT 130 MM HG: CPT | Mod: CPTII,S$GLB,, | Performed by: FAMILY MEDICINE

## 2021-01-07 PROCEDURE — 99999 PR PBB SHADOW E&M-EST. PATIENT-LVL III: CPT | Mod: PBBFAC,,, | Performed by: FAMILY MEDICINE

## 2021-01-07 PROCEDURE — 3074F PR MOST RECENT SYSTOLIC BLOOD PRESSURE < 130 MM HG: ICD-10-PCS | Mod: CPTII,S$GLB,, | Performed by: FAMILY MEDICINE

## 2021-01-07 PROCEDURE — 3008F PR BODY MASS INDEX (BMI) DOCUMENTED: ICD-10-PCS | Mod: CPTII,S$GLB,, | Performed by: FAMILY MEDICINE

## 2021-01-07 PROCEDURE — 1126F AMNT PAIN NOTED NONE PRSNT: CPT | Mod: S$GLB,,, | Performed by: FAMILY MEDICINE

## 2021-01-07 PROCEDURE — 1126F PR PAIN SEVERITY QUANTIFIED, NO PAIN PRESENT: ICD-10-PCS | Mod: S$GLB,,, | Performed by: FAMILY MEDICINE

## 2021-01-07 PROCEDURE — 3079F DIAST BP 80-89 MM HG: CPT | Mod: CPTII,S$GLB,, | Performed by: FAMILY MEDICINE

## 2021-01-07 PROCEDURE — 99214 PR OFFICE/OUTPT VISIT, EST, LEVL IV, 30-39 MIN: ICD-10-PCS | Mod: S$GLB,,, | Performed by: FAMILY MEDICINE

## 2021-01-07 RX ORDER — ALLOPURINOL 300 MG/1
300 TABLET ORAL DAILY
Qty: 90 TABLET | Refills: 1 | Status: SHIPPED | OUTPATIENT
Start: 2021-01-07 | End: 2021-07-07 | Stop reason: SDUPTHER

## 2021-01-07 RX ORDER — INDOMETHACIN 50 MG/1
50 CAPSULE ORAL
Qty: 9 CAPSULE | Refills: 0 | Status: SHIPPED | OUTPATIENT
Start: 2021-01-07 | End: 2021-01-10

## 2021-01-07 RX ORDER — AMLODIPINE BESYLATE 10 MG/1
10 TABLET ORAL DAILY
Qty: 90 TABLET | Refills: 1 | Status: SHIPPED | OUTPATIENT
Start: 2021-01-07 | End: 2021-07-07 | Stop reason: SDUPTHER

## 2021-06-30 ENCOUNTER — HOSPITAL ENCOUNTER (EMERGENCY)
Facility: HOSPITAL | Age: 41
Discharge: HOME | End: 2021-06-30
Attending: EMERGENCY MEDICINE
Payer: COMMERCIAL

## 2021-06-30 VITALS
DIASTOLIC BLOOD PRESSURE: 105 MMHG | HEART RATE: 86 BPM | BODY MASS INDEX: 29.92 KG/M2 | TEMPERATURE: 98 F | RESPIRATION RATE: 17 BRPM | OXYGEN SATURATION: 99 % | SYSTOLIC BLOOD PRESSURE: 147 MMHG | WEIGHT: 209 LBS | HEIGHT: 70 IN

## 2021-06-30 DIAGNOSIS — M25.511 ACUTE PAIN OF RIGHT SHOULDER: Primary | ICD-10-CM

## 2021-06-30 DIAGNOSIS — M54.32 SCIATICA OF LEFT SIDE: ICD-10-CM

## 2021-06-30 PROCEDURE — 63600175 PHARM REV CODE 636 W HCPCS: Mod: ER | Performed by: PHYSICIAN ASSISTANT

## 2021-06-30 PROCEDURE — 99284 EMERGENCY DEPT VISIT MOD MDM: CPT | Mod: 25,ER

## 2021-06-30 PROCEDURE — 96372 THER/PROPH/DIAG INJ SC/IM: CPT | Mod: ER

## 2021-06-30 RX ORDER — KETOROLAC TROMETHAMINE 10 MG/1
10 TABLET, FILM COATED ORAL EVERY 6 HOURS
Qty: 12 TABLET | Refills: 0 | Status: SHIPPED | OUTPATIENT
Start: 2021-06-30 | End: 2021-07-03

## 2021-06-30 RX ORDER — CYCLOBENZAPRINE HCL 10 MG
10 TABLET ORAL 3 TIMES DAILY PRN
Qty: 15 TABLET | Refills: 0 | Status: SHIPPED | OUTPATIENT
Start: 2021-06-30 | End: 2021-07-05

## 2021-06-30 RX ORDER — KETOROLAC TROMETHAMINE 30 MG/ML
30 INJECTION, SOLUTION INTRAMUSCULAR; INTRAVENOUS
Status: COMPLETED | OUTPATIENT
Start: 2021-06-30 | End: 2021-06-30

## 2021-06-30 RX ADMIN — KETOROLAC TROMETHAMINE 30 MG: 30 INJECTION, SOLUTION INTRAMUSCULAR; INTRAVENOUS at 07:06

## 2021-07-02 ENCOUNTER — LAB VISIT (OUTPATIENT)
Dept: LAB | Facility: HOSPITAL | Age: 41
End: 2021-07-02
Attending: FAMILY MEDICINE
Payer: COMMERCIAL

## 2021-07-02 DIAGNOSIS — Z87.39 HISTORY OF GOUT: ICD-10-CM

## 2021-07-02 DIAGNOSIS — E79.0 ELEVATED URIC ACID IN BLOOD: ICD-10-CM

## 2021-07-02 DIAGNOSIS — R71.8 MICROCYTOSIS: ICD-10-CM

## 2021-07-02 LAB
ANION GAP SERPL CALC-SCNC: 8 MMOL/L (ref 8–16)
BASOPHILS # BLD AUTO: 0.04 K/UL (ref 0–0.2)
BASOPHILS NFR BLD: 1 % (ref 0–1.9)
BUN SERPL-MCNC: 14 MG/DL (ref 6–20)
CALCIUM SERPL-MCNC: 9.3 MG/DL (ref 8.7–10.5)
CHLORIDE SERPL-SCNC: 106 MMOL/L (ref 95–110)
CO2 SERPL-SCNC: 25 MMOL/L (ref 23–29)
CREAT SERPL-MCNC: 1.3 MG/DL (ref 0.5–1.4)
DIFFERENTIAL METHOD: ABNORMAL
EOSINOPHIL # BLD AUTO: 0.2 K/UL (ref 0–0.5)
EOSINOPHIL NFR BLD: 5.1 % (ref 0–8)
ERYTHROCYTE [DISTWIDTH] IN BLOOD BY AUTOMATED COUNT: 15.4 % (ref 11.5–14.5)
EST. GFR  (AFRICAN AMERICAN): >60 ML/MIN/1.73 M^2
EST. GFR  (NON AFRICAN AMERICAN): >60 ML/MIN/1.73 M^2
GLUCOSE SERPL-MCNC: 90 MG/DL (ref 70–110)
HCT VFR BLD AUTO: 47.7 % (ref 40–54)
HGB BLD-MCNC: 14.5 G/DL (ref 14–18)
IMM GRANULOCYTES # BLD AUTO: 0.01 K/UL (ref 0–0.04)
IMM GRANULOCYTES NFR BLD AUTO: 0.2 % (ref 0–0.5)
LYMPHOCYTES # BLD AUTO: 1.4 K/UL (ref 1–4.8)
LYMPHOCYTES NFR BLD: 33.7 % (ref 18–48)
MCH RBC QN AUTO: 22.2 PG (ref 27–31)
MCHC RBC AUTO-ENTMCNC: 30.4 G/DL (ref 32–36)
MCV RBC AUTO: 73 FL (ref 82–98)
MONOCYTES # BLD AUTO: 0.5 K/UL (ref 0.3–1)
MONOCYTES NFR BLD: 13.1 % (ref 4–15)
NEUTROPHILS # BLD AUTO: 1.9 K/UL (ref 1.8–7.7)
NEUTROPHILS NFR BLD: 46.9 % (ref 38–73)
NRBC BLD-RTO: 0 /100 WBC
PLATELET # BLD AUTO: 356 K/UL (ref 150–450)
PMV BLD AUTO: 12.9 FL (ref 9.2–12.9)
POTASSIUM SERPL-SCNC: 4 MMOL/L (ref 3.5–5.1)
RBC # BLD AUTO: 6.53 M/UL (ref 4.6–6.2)
SODIUM SERPL-SCNC: 139 MMOL/L (ref 136–145)
URATE SERPL-MCNC: 6 MG/DL (ref 3.4–7)
WBC # BLD AUTO: 4.13 K/UL (ref 3.9–12.7)

## 2021-07-02 PROCEDURE — 85025 COMPLETE CBC W/AUTO DIFF WBC: CPT | Performed by: FAMILY MEDICINE

## 2021-07-02 PROCEDURE — 80048 BASIC METABOLIC PNL TOTAL CA: CPT | Performed by: FAMILY MEDICINE

## 2021-07-02 PROCEDURE — 84550 ASSAY OF BLOOD/URIC ACID: CPT | Performed by: FAMILY MEDICINE

## 2021-07-02 PROCEDURE — 36415 COLL VENOUS BLD VENIPUNCTURE: CPT | Mod: PO | Performed by: FAMILY MEDICINE

## 2021-07-07 ENCOUNTER — PATIENT MESSAGE (OUTPATIENT)
Dept: ADMINISTRATIVE | Facility: OTHER | Age: 41
End: 2021-07-07

## 2021-07-07 ENCOUNTER — OFFICE VISIT (OUTPATIENT)
Dept: FAMILY MEDICINE | Facility: CLINIC | Age: 41
End: 2021-07-07
Payer: COMMERCIAL

## 2021-07-07 VITALS
HEIGHT: 70 IN | DIASTOLIC BLOOD PRESSURE: 82 MMHG | HEART RATE: 82 BPM | WEIGHT: 207.44 LBS | SYSTOLIC BLOOD PRESSURE: 130 MMHG | OXYGEN SATURATION: 96 % | BODY MASS INDEX: 29.7 KG/M2

## 2021-07-07 DIAGNOSIS — Z87.39 HISTORY OF GOUT: ICD-10-CM

## 2021-07-07 DIAGNOSIS — E55.9 VITAMIN D DEFICIENCY: ICD-10-CM

## 2021-07-07 DIAGNOSIS — Z00.00 VISIT FOR WELL MAN HEALTH CHECK: ICD-10-CM

## 2021-07-07 DIAGNOSIS — E79.0 ELEVATED URIC ACID IN BLOOD: ICD-10-CM

## 2021-07-07 DIAGNOSIS — I10 ESSENTIAL HYPERTENSION: ICD-10-CM

## 2021-07-07 DIAGNOSIS — Z80.42 FAMILY HISTORY OF PROSTATE CANCER IN FATHER: ICD-10-CM

## 2021-07-07 DIAGNOSIS — G57.02 PIRIFORMIS SYNDROME OF LEFT SIDE: Primary | ICD-10-CM

## 2021-07-07 DIAGNOSIS — R71.8 MICROCYTOSIS: ICD-10-CM

## 2021-07-07 PROCEDURE — 3008F BODY MASS INDEX DOCD: CPT | Mod: CPTII,S$GLB,, | Performed by: FAMILY MEDICINE

## 2021-07-07 PROCEDURE — 99214 OFFICE O/P EST MOD 30 MIN: CPT | Mod: S$GLB,,, | Performed by: FAMILY MEDICINE

## 2021-07-07 PROCEDURE — 99214 PR OFFICE/OUTPT VISIT, EST, LEVL IV, 30-39 MIN: ICD-10-PCS | Mod: S$GLB,,, | Performed by: FAMILY MEDICINE

## 2021-07-07 PROCEDURE — 3008F PR BODY MASS INDEX (BMI) DOCUMENTED: ICD-10-PCS | Mod: CPTII,S$GLB,, | Performed by: FAMILY MEDICINE

## 2021-07-07 PROCEDURE — 99999 PR PBB SHADOW E&M-EST. PATIENT-LVL III: CPT | Mod: PBBFAC,,, | Performed by: FAMILY MEDICINE

## 2021-07-07 PROCEDURE — 99999 PR PBB SHADOW E&M-EST. PATIENT-LVL III: ICD-10-PCS | Mod: PBBFAC,,, | Performed by: FAMILY MEDICINE

## 2021-07-07 PROCEDURE — 1125F PR PAIN SEVERITY QUANTIFIED, PAIN PRESENT: ICD-10-PCS | Mod: S$GLB,,, | Performed by: FAMILY MEDICINE

## 2021-07-07 PROCEDURE — 1125F AMNT PAIN NOTED PAIN PRSNT: CPT | Mod: S$GLB,,, | Performed by: FAMILY MEDICINE

## 2021-07-07 RX ORDER — ALLOPURINOL 300 MG/1
300 TABLET ORAL DAILY
Qty: 90 TABLET | Refills: 1 | Status: SHIPPED | OUTPATIENT
Start: 2021-07-07 | End: 2021-12-15 | Stop reason: SDUPTHER

## 2021-07-07 RX ORDER — MELOXICAM 15 MG/1
15 TABLET ORAL DAILY
Qty: 30 TABLET | Refills: 0 | OUTPATIENT
Start: 2021-07-07 | End: 2021-10-05

## 2021-07-07 RX ORDER — AMLODIPINE BESYLATE 10 MG/1
10 TABLET ORAL DAILY
Qty: 90 TABLET | Refills: 1 | Status: SHIPPED | OUTPATIENT
Start: 2021-07-07 | End: 2021-12-15 | Stop reason: SDUPTHER

## 2021-10-05 ENCOUNTER — HOSPITAL ENCOUNTER (EMERGENCY)
Facility: HOSPITAL | Age: 41
Discharge: HOME | End: 2021-10-05
Attending: EMERGENCY MEDICINE
Payer: COMMERCIAL

## 2021-10-05 ENCOUNTER — TELEPHONE (OUTPATIENT)
Dept: FAMILY MEDICINE | Facility: CLINIC | Age: 41
End: 2021-10-05

## 2021-10-05 VITALS
WEIGHT: 209 LBS | HEART RATE: 71 BPM | OXYGEN SATURATION: 100 % | DIASTOLIC BLOOD PRESSURE: 92 MMHG | BODY MASS INDEX: 29.92 KG/M2 | SYSTOLIC BLOOD PRESSURE: 148 MMHG | HEIGHT: 70 IN | RESPIRATION RATE: 18 BRPM | TEMPERATURE: 98 F

## 2021-10-05 DIAGNOSIS — S46.911A RIGHT SHOULDER STRAIN, INITIAL ENCOUNTER: Primary | ICD-10-CM

## 2021-10-05 DIAGNOSIS — M25.511 ACUTE PAIN OF RIGHT SHOULDER: Primary | ICD-10-CM

## 2021-10-05 PROCEDURE — 96372 THER/PROPH/DIAG INJ SC/IM: CPT | Mod: ER

## 2021-10-05 PROCEDURE — 63600175 PHARM REV CODE 636 W HCPCS: Mod: ER | Performed by: EMERGENCY MEDICINE

## 2021-10-05 PROCEDURE — 99284 EMERGENCY DEPT VISIT MOD MDM: CPT | Mod: 25,ER

## 2021-10-05 RX ORDER — KETOROLAC TROMETHAMINE 30 MG/ML
10 INJECTION, SOLUTION INTRAMUSCULAR; INTRAVENOUS
Status: COMPLETED | OUTPATIENT
Start: 2021-10-05 | End: 2021-10-05

## 2021-10-05 RX ORDER — MELOXICAM 7.5 MG/1
7.5 TABLET ORAL DAILY
Qty: 7 TABLET | Refills: 0 | Status: SHIPPED | OUTPATIENT
Start: 2021-10-05 | End: 2021-10-06 | Stop reason: SDUPTHER

## 2021-10-05 RX ADMIN — KETOROLAC TROMETHAMINE 10 MG: 30 INJECTION, SOLUTION INTRAMUSCULAR at 02:10

## 2021-10-06 ENCOUNTER — HOSPITAL ENCOUNTER (OUTPATIENT)
Dept: RADIOLOGY | Facility: HOSPITAL | Age: 41
Discharge: HOME | End: 2021-10-06
Attending: FAMILY MEDICINE
Payer: COMMERCIAL

## 2021-10-06 DIAGNOSIS — M25.511 ACUTE PAIN OF RIGHT SHOULDER: ICD-10-CM

## 2021-10-06 PROCEDURE — 73030 X-RAY EXAM OF SHOULDER: CPT | Mod: TC,FY,PO,RT

## 2021-10-06 PROCEDURE — 73030 X-RAY EXAM OF SHOULDER: CPT | Mod: 26,RT,, | Performed by: RADIOLOGY

## 2021-10-06 PROCEDURE — 73030 XR SHOULDER TRAUMA 3 VIEW RIGHT: ICD-10-PCS | Mod: 26,RT,, | Performed by: RADIOLOGY

## 2021-10-06 RX ORDER — MELOXICAM 15 MG/1
15 TABLET ORAL DAILY
Qty: 90 TABLET | Refills: 0 | Status: SHIPPED | OUTPATIENT
Start: 2021-10-06 | End: 2021-12-15 | Stop reason: SDUPTHER

## 2021-10-07 ENCOUNTER — CLINICAL SUPPORT (OUTPATIENT)
Dept: REHABILITATION | Facility: HOSPITAL | Age: 41
End: 2021-10-07
Attending: FAMILY MEDICINE
Payer: COMMERCIAL

## 2021-10-07 DIAGNOSIS — M25.511 ACUTE PAIN OF RIGHT SHOULDER: ICD-10-CM

## 2021-10-07 DIAGNOSIS — R29.898 SHOULDER WEAKNESS: ICD-10-CM

## 2021-10-07 DIAGNOSIS — R29.3 POOR POSTURE: ICD-10-CM

## 2021-10-07 PROCEDURE — 97161 PT EVAL LOW COMPLEX 20 MIN: CPT | Mod: PO

## 2021-10-15 ENCOUNTER — DOCUMENTATION ONLY (OUTPATIENT)
Dept: REHABILITATION | Facility: HOSPITAL | Age: 41
End: 2021-10-15

## 2021-10-15 DIAGNOSIS — R29.898 SHOULDER WEAKNESS: Primary | ICD-10-CM

## 2021-10-15 DIAGNOSIS — R29.3 POOR POSTURE: ICD-10-CM

## 2021-12-15 ENCOUNTER — OFFICE VISIT (OUTPATIENT)
Dept: FAMILY MEDICINE | Facility: CLINIC | Age: 41
End: 2021-12-15
Payer: COMMERCIAL

## 2021-12-15 ENCOUNTER — IMMUNIZATION (OUTPATIENT)
Dept: PRIMARY CARE CLINIC | Facility: CLINIC | Age: 41
End: 2021-12-15
Payer: COMMERCIAL

## 2021-12-15 ENCOUNTER — LAB VISIT (OUTPATIENT)
Dept: LAB | Facility: HOSPITAL | Age: 41
End: 2021-12-15
Attending: FAMILY MEDICINE
Payer: COMMERCIAL

## 2021-12-15 VITALS
SYSTOLIC BLOOD PRESSURE: 120 MMHG | DIASTOLIC BLOOD PRESSURE: 82 MMHG | HEIGHT: 70 IN | BODY MASS INDEX: 28.18 KG/M2 | HEART RATE: 79 BPM | OXYGEN SATURATION: 98 % | WEIGHT: 196.88 LBS

## 2021-12-15 DIAGNOSIS — I10 ESSENTIAL HYPERTENSION: ICD-10-CM

## 2021-12-15 DIAGNOSIS — Z80.42 FAMILY HISTORY OF PROSTATE CANCER IN FATHER: ICD-10-CM

## 2021-12-15 DIAGNOSIS — E79.0 ELEVATED URIC ACID IN BLOOD: ICD-10-CM

## 2021-12-15 DIAGNOSIS — Z87.39 HISTORY OF GOUT: ICD-10-CM

## 2021-12-15 DIAGNOSIS — R71.8 MICROCYTOSIS: ICD-10-CM

## 2021-12-15 DIAGNOSIS — E55.9 VITAMIN D DEFICIENCY: ICD-10-CM

## 2021-12-15 DIAGNOSIS — Z00.00 VISIT FOR WELL MAN HEALTH CHECK: ICD-10-CM

## 2021-12-15 DIAGNOSIS — Z00.00 VISIT FOR WELL MAN HEALTH CHECK: Primary | ICD-10-CM

## 2021-12-15 DIAGNOSIS — Z23 NEED FOR VACCINATION: Primary | ICD-10-CM

## 2021-12-15 PROBLEM — R29.3 POOR POSTURE: Status: RESOLVED | Noted: 2021-10-07 | Resolved: 2021-12-15

## 2021-12-15 LAB
25(OH)D3+25(OH)D2 SERPL-MCNC: 41 NG/ML (ref 30–96)
ALBUMIN SERPL BCP-MCNC: 4.3 G/DL (ref 3.5–5.2)
ALP SERPL-CCNC: 42 U/L (ref 55–135)
ALT SERPL W/O P-5'-P-CCNC: 18 U/L (ref 10–44)
ANION GAP SERPL CALC-SCNC: 10 MMOL/L (ref 8–16)
AST SERPL-CCNC: 19 U/L (ref 10–40)
BASOPHILS # BLD AUTO: 0.04 K/UL (ref 0–0.2)
BASOPHILS NFR BLD: 1.1 % (ref 0–1.9)
BILIRUB SERPL-MCNC: 0.5 MG/DL (ref 0.1–1)
BUN SERPL-MCNC: 11 MG/DL (ref 6–20)
CALCIUM SERPL-MCNC: 10.4 MG/DL (ref 8.7–10.5)
CHLORIDE SERPL-SCNC: 102 MMOL/L (ref 95–110)
CHOLEST SERPL-MCNC: 167 MG/DL (ref 120–199)
CHOLEST/HDLC SERPL: 3.3 {RATIO} (ref 2–5)
CO2 SERPL-SCNC: 24 MMOL/L (ref 23–29)
COMPLEXED PSA SERPL-MCNC: 1.4 NG/ML (ref 0–4)
CREAT SERPL-MCNC: 1.2 MG/DL (ref 0.5–1.4)
DIFFERENTIAL METHOD: ABNORMAL
EOSINOPHIL # BLD AUTO: 0.1 K/UL (ref 0–0.5)
EOSINOPHIL NFR BLD: 3.5 % (ref 0–8)
ERYTHROCYTE [DISTWIDTH] IN BLOOD BY AUTOMATED COUNT: 14.6 % (ref 11.5–14.5)
EST. GFR  (AFRICAN AMERICAN): >60 ML/MIN/1.73 M^2
EST. GFR  (NON AFRICAN AMERICAN): >60 ML/MIN/1.73 M^2
ESTIMATED AVG GLUCOSE: 105 MG/DL (ref 68–131)
FERRITIN SERPL-MCNC: 65 NG/ML (ref 20–300)
GLUCOSE SERPL-MCNC: 90 MG/DL (ref 70–110)
HBA1C MFR BLD: 5.3 % (ref 4–5.6)
HCT VFR BLD AUTO: 46.8 % (ref 40–54)
HDLC SERPL-MCNC: 50 MG/DL (ref 40–75)
HDLC SERPL: 29.9 % (ref 20–50)
HGB BLD-MCNC: 14.3 G/DL (ref 14–18)
IMM GRANULOCYTES # BLD AUTO: 0.01 K/UL (ref 0–0.04)
IMM GRANULOCYTES NFR BLD AUTO: 0.3 % (ref 0–0.5)
IRON SERPL-MCNC: 85 UG/DL (ref 45–160)
LDLC SERPL CALC-MCNC: 101 MG/DL (ref 63–159)
LYMPHOCYTES # BLD AUTO: 1 K/UL (ref 1–4.8)
LYMPHOCYTES NFR BLD: 27.1 % (ref 18–48)
MCH RBC QN AUTO: 22 PG (ref 27–31)
MCHC RBC AUTO-ENTMCNC: 30.6 G/DL (ref 32–36)
MCV RBC AUTO: 72 FL (ref 82–98)
MONOCYTES # BLD AUTO: 0.6 K/UL (ref 0.3–1)
MONOCYTES NFR BLD: 15 % (ref 4–15)
NEUTROPHILS # BLD AUTO: 2 K/UL (ref 1.8–7.7)
NEUTROPHILS NFR BLD: 53 % (ref 38–73)
NONHDLC SERPL-MCNC: 117 MG/DL
NRBC BLD-RTO: 0 /100 WBC
PLATELET # BLD AUTO: 369 K/UL (ref 150–450)
PMV BLD AUTO: 12.3 FL (ref 9.2–12.9)
POTASSIUM SERPL-SCNC: 4.5 MMOL/L (ref 3.5–5.1)
PROT SERPL-MCNC: 8.2 G/DL (ref 6–8.4)
RBC # BLD AUTO: 6.51 M/UL (ref 4.6–6.2)
SATURATED IRON: 18 % (ref 20–50)
SODIUM SERPL-SCNC: 136 MMOL/L (ref 136–145)
TOTAL IRON BINDING CAPACITY: 466 UG/DL (ref 250–450)
TRANSFERRIN SERPL-MCNC: 315 MG/DL (ref 200–375)
TRIGL SERPL-MCNC: 80 MG/DL (ref 30–150)
TSH SERPL DL<=0.005 MIU/L-ACNC: 2.48 UIU/ML (ref 0.4–4)
URATE SERPL-MCNC: 6.1 MG/DL (ref 3.4–7)
WBC # BLD AUTO: 3.73 K/UL (ref 3.9–12.7)

## 2021-12-15 PROCEDURE — 82728 ASSAY OF FERRITIN: CPT | Performed by: FAMILY MEDICINE

## 2021-12-15 PROCEDURE — 0004A COVID-19, MRNA, LNP-S, PF, 30 MCG/0.3 ML DOSE VACCINE: CPT | Mod: CV19,PBBFAC | Performed by: INTERNAL MEDICINE

## 2021-12-15 PROCEDURE — 84550 ASSAY OF BLOOD/URIC ACID: CPT | Performed by: FAMILY MEDICINE

## 2021-12-15 PROCEDURE — 80061 LIPID PANEL: CPT | Performed by: FAMILY MEDICINE

## 2021-12-15 PROCEDURE — 84153 ASSAY OF PSA TOTAL: CPT | Performed by: FAMILY MEDICINE

## 2021-12-15 PROCEDURE — 99999 PR PBB SHADOW E&M-EST. PATIENT-LVL III: ICD-10-PCS | Mod: PBBFAC,,, | Performed by: FAMILY MEDICINE

## 2021-12-15 PROCEDURE — 83036 HEMOGLOBIN GLYCOSYLATED A1C: CPT | Performed by: FAMILY MEDICINE

## 2021-12-15 PROCEDURE — 36415 COLL VENOUS BLD VENIPUNCTURE: CPT | Mod: PO | Performed by: FAMILY MEDICINE

## 2021-12-15 PROCEDURE — 84466 ASSAY OF TRANSFERRIN: CPT | Performed by: FAMILY MEDICINE

## 2021-12-15 PROCEDURE — 99396 PR PREVENTIVE VISIT,EST,40-64: ICD-10-PCS | Mod: S$GLB,,, | Performed by: FAMILY MEDICINE

## 2021-12-15 PROCEDURE — 84443 ASSAY THYROID STIM HORMONE: CPT | Performed by: FAMILY MEDICINE

## 2021-12-15 PROCEDURE — 99999 PR PBB SHADOW E&M-EST. PATIENT-LVL III: CPT | Mod: PBBFAC,,, | Performed by: FAMILY MEDICINE

## 2021-12-15 PROCEDURE — 82306 VITAMIN D 25 HYDROXY: CPT | Performed by: FAMILY MEDICINE

## 2021-12-15 PROCEDURE — 99396 PREV VISIT EST AGE 40-64: CPT | Mod: S$GLB,,, | Performed by: FAMILY MEDICINE

## 2021-12-15 PROCEDURE — 85025 COMPLETE CBC W/AUTO DIFF WBC: CPT | Performed by: FAMILY MEDICINE

## 2021-12-15 PROCEDURE — 80053 COMPREHEN METABOLIC PANEL: CPT | Performed by: FAMILY MEDICINE

## 2021-12-15 RX ORDER — MELOXICAM 7.5 MG/1
7.5 TABLET ORAL DAILY
Qty: 90 TABLET | Refills: 1 | Status: SHIPPED | OUTPATIENT
Start: 2021-12-15 | End: 2022-03-14 | Stop reason: SDUPTHER

## 2021-12-15 RX ORDER — AMLODIPINE BESYLATE 10 MG/1
10 TABLET ORAL DAILY
Qty: 90 TABLET | Refills: 1 | Status: SHIPPED | OUTPATIENT
Start: 2021-12-15 | End: 2022-03-16

## 2021-12-15 RX ORDER — ALLOPURINOL 300 MG/1
300 TABLET ORAL DAILY
Qty: 90 TABLET | Refills: 1 | Status: SHIPPED | OUTPATIENT
Start: 2021-12-15 | End: 2022-11-22 | Stop reason: SDUPTHER

## 2022-02-22 ENCOUNTER — TELEPHONE (OUTPATIENT)
Dept: FAMILY MEDICINE | Facility: CLINIC | Age: 42
End: 2022-02-22
Payer: COMMERCIAL

## 2022-02-22 NOTE — TELEPHONE ENCOUNTER
I spoke with patient his daughter(Luis Lezama) was born 2/19/22 baby is currently in the NICU in [place]. Dad do not know when she's getting discharged. I advised dad to call when baby is being discharged for appointment. Patient voiced understanding.

## 2022-02-22 NOTE — TELEPHONE ENCOUNTER
----- Message from Emilia Lowery sent at 2/22/2022  9:26 AM CST -----  Type:  Patient Returning Call    Who Called:pt  Who Left Message for Patient:office  Does the patient know what this is regarding?:pt  daughter still in hospital  Would the patient rather a call back or a response via [facility]? please  Best Call Back Number:[phone]  Additional Information:

## 2022-02-23 ENCOUNTER — TELEPHONE (OUTPATIENT)
Dept: FAMILY MEDICINE | Facility: CLINIC | Age: 42
End: 2022-02-23
Payer: COMMERCIAL

## 2022-02-23 NOTE — TELEPHONE ENCOUNTER
----- Message from Brooklyn Ayala sent at 2022 12:08 PM CST -----  Regarding:  Inquiry  Type:  Needs Medical Advice    Who Called: pt    Would the patient rather a call back or a response via [facility]? call  Best Call Back Number: [phone]

## 2022-02-23 NOTE — TELEPHONE ENCOUNTER
I spoke with patient and advised him that Luis has appointment for Saturday at 11 am. Patient voiced understanding.

## 2022-03-14 ENCOUNTER — HOSPITAL ENCOUNTER (EMERGENCY)
Facility: HOSPITAL | Age: 42
Discharge: HOME | End: 2022-03-15
Attending: EMERGENCY MEDICINE
Payer: COMMERCIAL

## 2022-03-14 VITALS
HEIGHT: 70 IN | HEART RATE: 90 BPM | SYSTOLIC BLOOD PRESSURE: 158 MMHG | OXYGEN SATURATION: 100 % | TEMPERATURE: 98 F | BODY MASS INDEX: 29.49 KG/M2 | DIASTOLIC BLOOD PRESSURE: 98 MMHG | WEIGHT: 206 LBS | RESPIRATION RATE: 18 BRPM

## 2022-03-14 DIAGNOSIS — S66.911A STRAIN OF RIGHT HAND, INITIAL ENCOUNTER: Primary | ICD-10-CM

## 2022-03-14 DIAGNOSIS — G56.03 BILATERAL CARPAL TUNNEL SYNDROME: ICD-10-CM

## 2022-03-14 PROCEDURE — 99284 EMERGENCY DEPT VISIT MOD MDM: CPT | Mod: ER

## 2022-03-15 PROCEDURE — 25000003 PHARM REV CODE 250: Mod: ER | Performed by: EMERGENCY MEDICINE

## 2022-03-15 RX ORDER — DICLOFENAC SODIUM 10 MG/G
2 GEL TOPICAL 4 TIMES DAILY
Qty: 100 G | Refills: 0 | Status: SHIPPED | OUTPATIENT
Start: 2022-03-15 | End: 2022-11-22 | Stop reason: SDUPTHER

## 2022-03-15 RX ORDER — KETOROLAC TROMETHAMINE 10 MG/1
10 TABLET, FILM COATED ORAL
Status: COMPLETED | OUTPATIENT
Start: 2022-03-15 | End: 2022-03-15

## 2022-03-15 RX ORDER — ACETAMINOPHEN 500 MG
1000 TABLET ORAL
Status: COMPLETED | OUTPATIENT
Start: 2022-03-15 | End: 2022-03-15

## 2022-03-15 RX ORDER — MELOXICAM 7.5 MG/1
15 TABLET ORAL DAILY
Qty: 90 TABLET | Refills: 1 | Status: SHIPPED | OUTPATIENT
Start: 2022-03-15 | End: 2022-06-13

## 2022-03-15 RX ORDER — ACETAMINOPHEN 500 MG
1000 TABLET ORAL EVERY 6 HOURS PRN
Qty: 50 TABLET | Refills: 0 | Status: SHIPPED | OUTPATIENT
Start: 2022-03-15 | End: 2022-11-22

## 2022-03-15 RX ADMIN — ACETAMINOPHEN 1000 MG: 500 TABLET ORAL at 12:03

## 2022-03-15 RX ADMIN — KETOROLAC TROMETHAMINE 10 MG: 10 TABLET, FILM COATED ORAL at 12:03

## 2022-03-15 NOTE — ED PROVIDER NOTES
Encounter Date: 3/14/2022       History     Chief Complaint   Patient presents with    Hand Pain     Pt to the Er with nontraumatic hand pain. Pt reports started with pain this morning in left hand and now has pain in right hand. Pt reports tingling. Pt took 2 doses of Meloxicam today with no relief.      42-year-old man with a past medical history significant for shoulder strains enhance strains in the past who presents for evaluation of pain in the right hand and pain in the left arm similar to previous episodes he has had.  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  He had an episode where the finger felt somewhat numb tonight which is what made him concerned and decided to come the emergency room though the feeling has come back into the finger and overall his pain has improved.  He denies any precipitating trauma or injuries elsewhere.  He occasionally uses diclofenac to try and help with his symptoms but takes it is an as-needed on occasion.        Review of patient's allergies indicates:  No Known Allergies  Past Medical History:   Diagnosis Date    Cardiac murmur     Chlamydia infection     Cluster headaches     Costochondritis     Gout, chronic     Hypertension      Past Surgical History:   Procedure Laterality Date    HERNIA REPAIR Right     in his 20s     Family History   Problem Relation Age of Onset    Hypertension Mother     Heart disease Maternal Grandmother     Stroke Maternal Grandmother     Hypertension Maternal Grandmother     Prostate cancer Father 59    Cancer Father     No Known Problems Sister     Hypertension Brother     No Known Problems Sister     No Known Problems Sister      Social History     Tobacco Use    Smoking status: Never Smoker    Smokeless tobacco: Never Used   Substance Use Topics    Alcohol use: Yes     Comment: 12/15/2021: was drinking 6-7 beers/night, now down to 2-3  drinks    Drug use: No     Review of Systems  Constitutional-no fever  HEENT-no congestion  Eyes-no redness  Respiratory-no shortness of breath  Cardio-no chest pain  GI-no abdominal pain  Endocrine-no cold intolerance  -no difficulty urinating  MSK-positive arm pain, positive hand pain  Skin-no rashes  Allergy-no environmental allergy  Neurologic-, no headache  Hematology-no swollen nodes  Behavioral-no confusion  Physical Exam     Initial Vitals [03/14/22 2350]   BP Pulse Resp Temp SpO2   (!) 158/98 90 18 98.3 °F (36.8 °C) 100 %      MAP       --         Physical Exam  Constitutional: Well appearing, no distress.  Eyes: Conjunctivae normal.  ENT       Head: Normocephalic, atraumatic.       Nose: Normal external appearance        Mouth/Throat: no strigulous respirations   Hematological/Lymphatic/Immunilogical: no visible lymphadenopathy   Cardiovascular: Normal rate,   Respiratory: Normal respiratory effort.   Gastrointestinal: non distended   Musculoskeletal: Normal range of motion in all extremities. No obvious deformities or swelling.  Positive Tinel sign in the right hand,  Neurologic: Alert, oriented. Normal speech and language. No gross focal neurologic deficits are appreciated.  Skin: Skin is warm, dry. No rash noted.  Psychiatric: Mood and affect are normal.   ED Course   Procedures  Labs Reviewed - No data to display       Imaging Results    None          Medications   acetaminophen tablet 1,000 mg (1,000 mg Oral Given 3/15/22 0013)   ketorolac tablet 10 mg (10 mg Oral Given 3/15/22 0013)     Medical Decision Making:   History:   Old Medical Records: I decided to obtain old medical records.  Old Records Summarized: records from clinic visits and records from previous admission(s).  Differential Diagnosis:   Arthritis, gout, carpal tunnel syndrome  ED Management:  Clinically this gentleman demonstrates signs of an overuse injury, he has a positive Tinel sign.  Discussed with him his work putting him at  risk for such issues and the consideration of potential change.  Also discussed the appropriate dosing of diclofenac.                      Clinical Impression:   Final diagnoses:  [S66.911A] Strain of right hand, initial encounter (Primary)  [G56.03] Bilateral carpal tunnel syndrome          ED Disposition Condition    Discharge Stable        ED Prescriptions     Medication Sig Dispense Start Date End Date Auth. Provider    diclofenac sodium (VOLTAREN) 1 % Gel Apply 2 g topically 4 (four) times daily. 100 g 3/15/2022  Keiht La MD    acetaminophen (TYLENOL) 500 MG tablet Take 2 tablets (1,000 mg total) by mouth every 6 (six) hours as needed for Pain. 50 tablet 3/15/2022  Keith La MD    meloxicam (MOBIC) 7.5 MG tablet Take 2 tablets (15 mg total) by mouth once daily. 90 tablet 3/15/2022 6/13/2022 Keith La MD        Follow-up Information     Follow up With Specialties Details Why Contact Info Additional Information    [facility] Schedule an appointment as soon as possible for a visit  As needed, For a follow up visit about today [id number] [place] [address], [address]  [facility] [address] [phone] [address]           Keith La MD  03/15/22 0251

## 2022-03-15 NOTE — DISCHARGE INSTRUCTIONS
Mr. Lezama,    Thank you for letting me care for you today! It was nice meeting you, and I hope you feel better soon.   If you would like access to your chart and what was done today please utilize the [facility].   Please come back to [facility] for all of your future medical needs.    Our goal in the emergency department is to always give you outstanding care and exceptional service. You may receive a survey by mail or e-mail in the next week regarding your experience in our ED. We would greatly appreciate you completing and returning the survey. Your feedback provides us with a way to recognize our staff who give very good care and it helps us learn how to improve when your experience was below our aspiration of excellence.     Sincerely,    Keith La MD  Board Certified Emergency Physician

## 2022-03-15 NOTE — ED TRIAGE NOTES
"Reports to ED c c/o PARADISE, intermittent hand pain. States "it may be from the work gun I use." Pain in PARADISE wrist. Reports sometimes get numbness in digits. Also reports pain intermittently moves up L arm. Full ROM to BUE. Denies any injury.   "

## 2022-11-22 ENCOUNTER — OFFICE VISIT (OUTPATIENT)
Dept: FAMILY MEDICINE | Facility: CLINIC | Age: 42
End: 2022-11-22
Payer: COMMERCIAL

## 2022-11-22 VITALS
SYSTOLIC BLOOD PRESSURE: 132 MMHG | WEIGHT: 205.5 LBS | BODY MASS INDEX: 29.42 KG/M2 | HEIGHT: 70 IN | HEART RATE: 104 BPM | OXYGEN SATURATION: 98 % | DIASTOLIC BLOOD PRESSURE: 88 MMHG

## 2022-11-22 DIAGNOSIS — Z87.39 HISTORY OF GOUT: ICD-10-CM

## 2022-11-22 DIAGNOSIS — E79.0 ELEVATED URIC ACID IN BLOOD: ICD-10-CM

## 2022-11-22 DIAGNOSIS — Z00.00 VISIT FOR WELL MAN HEALTH CHECK: ICD-10-CM

## 2022-11-22 DIAGNOSIS — I10 ESSENTIAL HYPERTENSION: Primary | ICD-10-CM

## 2022-11-22 DIAGNOSIS — Z80.42 FAMILY HISTORY OF PROSTATE CANCER IN FATHER: ICD-10-CM

## 2022-11-22 DIAGNOSIS — M25.511 ACUTE PAIN OF RIGHT SHOULDER: ICD-10-CM

## 2022-11-22 PROBLEM — R29.898 SHOULDER WEAKNESS: Status: RESOLVED | Noted: 2021-10-07 | Resolved: 2022-11-22

## 2022-11-22 PROCEDURE — 3075F PR MOST RECENT SYSTOLIC BLOOD PRESS GE 130-139MM HG: ICD-10-PCS | Mod: CPTII,S$GLB,, | Performed by: FAMILY MEDICINE

## 2022-11-22 PROCEDURE — 99214 PR OFFICE/OUTPT VISIT, EST, LEVL IV, 30-39 MIN: ICD-10-PCS | Mod: S$GLB,,, | Performed by: FAMILY MEDICINE

## 2022-11-22 PROCEDURE — 1160F RVW MEDS BY RX/DR IN RCRD: CPT | Mod: CPTII,S$GLB,, | Performed by: FAMILY MEDICINE

## 2022-11-22 PROCEDURE — 99214 OFFICE O/P EST MOD 30 MIN: CPT | Mod: S$GLB,,, | Performed by: FAMILY MEDICINE

## 2022-11-22 PROCEDURE — 1159F PR MEDICATION LIST DOCUMENTED IN MEDICAL RECORD: ICD-10-PCS | Mod: CPTII,S$GLB,, | Performed by: FAMILY MEDICINE

## 2022-11-22 PROCEDURE — 3008F BODY MASS INDEX DOCD: CPT | Mod: CPTII,S$GLB,, | Performed by: FAMILY MEDICINE

## 2022-11-22 PROCEDURE — 99999 PR PBB SHADOW E&M-EST. PATIENT-LVL IV: CPT | Mod: PBBFAC,,, | Performed by: FAMILY MEDICINE

## 2022-11-22 PROCEDURE — 3075F SYST BP GE 130 - 139MM HG: CPT | Mod: CPTII,S$GLB,, | Performed by: FAMILY MEDICINE

## 2022-11-22 PROCEDURE — 1160F PR REVIEW ALL MEDS BY PRESCRIBER/CLIN PHARMACIST DOCUMENTED: ICD-10-PCS | Mod: CPTII,S$GLB,, | Performed by: FAMILY MEDICINE

## 2022-11-22 PROCEDURE — 3079F DIAST BP 80-89 MM HG: CPT | Mod: CPTII,S$GLB,, | Performed by: FAMILY MEDICINE

## 2022-11-22 PROCEDURE — 3079F PR MOST RECENT DIASTOLIC BLOOD PRESSURE 80-89 MM HG: ICD-10-PCS | Mod: CPTII,S$GLB,, | Performed by: FAMILY MEDICINE

## 2022-11-22 PROCEDURE — 99999 PR PBB SHADOW E&M-EST. PATIENT-LVL IV: ICD-10-PCS | Mod: PBBFAC,,, | Performed by: FAMILY MEDICINE

## 2022-11-22 PROCEDURE — 1159F MED LIST DOCD IN RCRD: CPT | Mod: CPTII,S$GLB,, | Performed by: FAMILY MEDICINE

## 2022-11-22 PROCEDURE — 3008F PR BODY MASS INDEX (BMI) DOCUMENTED: ICD-10-PCS | Mod: CPTII,S$GLB,, | Performed by: FAMILY MEDICINE

## 2022-11-22 RX ORDER — AMLODIPINE BESYLATE 10 MG/1
10 TABLET ORAL DAILY
Qty: 90 TABLET | Refills: 1 | Status: SHIPPED | OUTPATIENT
Start: 2022-11-22 | End: 2023-07-27 | Stop reason: SDUPTHER

## 2022-11-22 RX ORDER — ALLOPURINOL 300 MG/1
300 TABLET ORAL DAILY
Qty: 90 TABLET | Refills: 1 | Status: SHIPPED | OUTPATIENT
Start: 2022-11-22 | End: 2023-05-29

## 2022-11-22 RX ORDER — DICLOFENAC SODIUM 10 MG/G
2 GEL TOPICAL 4 TIMES DAILY
Qty: 100 G | Refills: 0 | Status: SHIPPED | OUTPATIENT
Start: 2022-11-22

## 2022-11-22 RX ORDER — MELOXICAM 15 MG/1
15 TABLET ORAL DAILY
Qty: 30 TABLET | Refills: 1 | Status: SHIPPED | OUTPATIENT
Start: 2022-11-22 | End: 2023-08-24 | Stop reason: SDUPTHER

## 2022-11-22 NOTE — PATIENT INSTRUCTIONS
Suspect arthritis/tendonitis for the shoulder  Meloxicam  Voltaren gel  Consider PT  Numbness, related to sleep?  Try to sleep on the back  Consider wrist brace?  If persisting, consider neck imaging and/or hand surgery referral    Restart allopurinol    Continue amlodipine  Start digital HTN program  Consider adding medication?    F/u April, annual, labs prior.

## 2022-11-22 NOTE — PROGRESS NOTES
Subjective:       Patient ID: Alli Lezama is a 42 y.o. male.    Chief Complaint: Hypertension    Alli is a 42 y.o. male who presents today     HTN: on amlodipine 10 mg. No chest pain, no SOB. No light headed, no dizziness.   Elevated uric acid: on allopurinol. Uric acid at goal 12/15/2021. Not taking allopurinol. No reason for this, no side effects. Last took it, at an unknown time    He is having right sided shoulder pain. Has had pain like this before, meloxicam helped. No trauma, no falls. However, work is physical, has to lift tires, and work on cars. XR in 2021, was normal. No swelling, no redness, no fevers. Occasionally feels numb, when he sleeps on it. Has numbness in all of his fingers, left. No neck pain.       Review of Systems   Constitutional:  Negative for chills and fever.   Respiratory:  Negative for shortness of breath.    Cardiovascular:  Negative for chest pain.   Gastrointestinal:  Negative for diarrhea, nausea and vomiting.   Musculoskeletal:  Positive for arthralgias.   Neurological:  Negative for dizziness, light-headedness and headaches.              Objective:     Vitals:    11/22/22 1100   BP: 132/88   Pulse: 104        Physical Exam  Constitutional:       General: He is not in acute distress.     Appearance: He is not ill-appearing, toxic-appearing or diaphoretic.   Cardiovascular:      Rate and Rhythm: Normal rate and regular rhythm.   Pulmonary:      Effort: Pulmonary effort is normal.      Breath sounds: Normal breath sounds.   Abdominal:      Palpations: Abdomen is soft.      Tenderness: There is no abdominal tenderness.   Musculoskeletal:         General: No swelling.      Comments: Limited ROM, especially in overhead rotation, of the right arm/shoulder  Normal strength BL    Cross Arm Test for AC arthritis: pain, and limited ROM, right.     Other provacative maneuvers either normal, or unable to perform due to limited ROM.     strength normal     Neurological:      General:  No focal deficit present.      Mental Status: He is alert.   Psychiatric:         Mood and Affect: Mood normal.         Behavior: Behavior normal.         Thought Content: Thought content normal.         Judgment: Judgment normal.       Assessment:       1. Essential hypertension    2. History of gout    3. Elevated uric acid in blood    4. Acute pain of right shoulder    5. Visit for well man health check    6. Family history of prostate cancer in father        Plan:         Suspect arthritis/tendonitis for the shoulder  Much less likely but possible, gout?  Meloxicam  Voltaren gel  Consider PT, declined today.   Numbness, related to sleep?  Try to sleep on the back  Consider wrist brace?  If persisting, consider neck imaging and/or hand surgery referral  If shoulder pain persists, and is the predominant symptom, injection?    Restart allopurinol    Continue amlodipine  Start digital HTN program  Consider adding medication?    F/u April, annual, labs prior.       Essential hypertension  -     amLODIPine (NORVASC) 10 MG tablet; Take 1 tablet (10 mg total) by mouth once daily.  Dispense: 90 tablet; Refill: 1  -     CBC Auto Differential; Future; Expected date: 11/22/2022  -     Comprehensive Metabolic Panel; Future; Expected date: 11/22/2022  -     TSH; Future; Expected date: 11/22/2022  -     Hypertension Digital Medicine ([facility]) Enrollment Order  -     Hypertension Digital Medicine (HDMP): Assign Onboarding Questionnaires    History of gout  -     allopurinoL (ZYLOPRIM) 300 MG tablet; Take 1 tablet (300 mg total) by mouth once daily.  Dispense: 90 tablet; Refill: 1  -     URIC ACID; Future; Expected date: 11/22/2022    Elevated uric acid in blood  -     allopurinoL (ZYLOPRIM) 300 MG tablet; Take 1 tablet (300 mg total) by mouth once daily.  Dispense: 90 tablet; Refill: 1  -     URIC ACID; Future; Expected date: 11/22/2022    Acute pain of right shoulder  -     meloxicam (MOBIC) 15 MG tablet; Take 1 tablet (15 mg  total) by mouth once daily.  Dispense: 30 tablet; Refill: 1  -     diclofenac sodium (VOLTAREN) 1 % Gel; Apply 2 g topically 4 (four) times daily.  Dispense: 100 g; Refill: 0    Visit for well man health check  -     CBC Auto Differential; Future; Expected date: 11/22/2022  -     Comprehensive Metabolic Panel; Future; Expected date: 11/22/2022  -     Hemoglobin A1C; Future; Expected date: 11/22/2022  -     Lipid Panel; Future; Expected date: 11/22/2022  -     TSH; Future; Expected date: 11/22/2022  -     PSA, Screening; Future; Expected date: 11/22/2022  -     URIC ACID; Future; Expected date: 11/22/2022    Family history of prostate cancer in father  -     PSA, Screening; Future; Expected date: 11/22/2022        Warning signs discussed, patient to call with any further issues or worsening of symptoms.

## 2023-04-19 ENCOUNTER — LAB VISIT (OUTPATIENT)
Dept: LAB | Facility: HOSPITAL | Age: 43
End: 2023-04-19
Attending: FAMILY MEDICINE
Payer: COMMERCIAL

## 2023-04-19 DIAGNOSIS — Z80.42 FAMILY HISTORY OF PROSTATE CANCER IN FATHER: ICD-10-CM

## 2023-04-19 DIAGNOSIS — Z00.00 VISIT FOR WELL MAN HEALTH CHECK: ICD-10-CM

## 2023-04-19 DIAGNOSIS — I10 ESSENTIAL HYPERTENSION: ICD-10-CM

## 2023-04-19 DIAGNOSIS — E79.0 ELEVATED URIC ACID IN BLOOD: ICD-10-CM

## 2023-04-19 DIAGNOSIS — Z87.39 HISTORY OF GOUT: ICD-10-CM

## 2023-04-19 LAB
ALBUMIN SERPL BCP-MCNC: 4.1 G/DL (ref 3.5–5.2)
ALP SERPL-CCNC: 44 U/L (ref 55–135)
ALT SERPL W/O P-5'-P-CCNC: 17 U/L (ref 10–44)
ANION GAP SERPL CALC-SCNC: 10 MMOL/L (ref 8–16)
AST SERPL-CCNC: 17 U/L (ref 10–40)
BASOPHILS # BLD AUTO: 0.03 K/UL (ref 0–0.2)
BASOPHILS NFR BLD: 0.9 % (ref 0–1.9)
BILIRUB SERPL-MCNC: 0.4 MG/DL (ref 0.1–1)
BUN SERPL-MCNC: 13 MG/DL (ref 6–20)
CALCIUM SERPL-MCNC: 9.4 MG/DL (ref 8.7–10.5)
CHLORIDE SERPL-SCNC: 103 MMOL/L (ref 95–110)
CHOLEST SERPL-MCNC: 166 MG/DL (ref 120–199)
CHOLEST/HDLC SERPL: 3.6 {RATIO} (ref 2–5)
CO2 SERPL-SCNC: 25 MMOL/L (ref 23–29)
COMPLEXED PSA SERPL-MCNC: 0.97 NG/ML (ref 0–4)
CREAT SERPL-MCNC: 1.2 MG/DL (ref 0.5–1.4)
DIFFERENTIAL METHOD: ABNORMAL
EOSINOPHIL # BLD AUTO: 0.5 K/UL (ref 0–0.5)
EOSINOPHIL NFR BLD: 14.2 % (ref 0–8)
ERYTHROCYTE [DISTWIDTH] IN BLOOD BY AUTOMATED COUNT: 15.7 % (ref 11.5–14.5)
EST. GFR  (NO RACE VARIABLE): >60 ML/MIN/1.73 M^2
ESTIMATED AVG GLUCOSE: 100 MG/DL (ref 68–131)
GLUCOSE SERPL-MCNC: 60 MG/DL (ref 70–110)
HBA1C MFR BLD: 5.1 % (ref 4–5.6)
HCT VFR BLD AUTO: 45.3 % (ref 40–54)
HDLC SERPL-MCNC: 46 MG/DL (ref 40–75)
HDLC SERPL: 27.7 % (ref 20–50)
HGB BLD-MCNC: 13.3 G/DL (ref 14–18)
IMM GRANULOCYTES # BLD AUTO: 0.02 K/UL (ref 0–0.04)
IMM GRANULOCYTES NFR BLD AUTO: 0.6 % (ref 0–0.5)
LDLC SERPL CALC-MCNC: 106.6 MG/DL (ref 63–159)
LYMPHOCYTES # BLD AUTO: 1.1 K/UL (ref 1–4.8)
LYMPHOCYTES NFR BLD: 32.4 % (ref 18–48)
MCH RBC QN AUTO: 21.5 PG (ref 27–31)
MCHC RBC AUTO-ENTMCNC: 29.4 G/DL (ref 32–36)
MCV RBC AUTO: 73 FL (ref 82–98)
MONOCYTES # BLD AUTO: 0.5 K/UL (ref 0.3–1)
MONOCYTES NFR BLD: 13.9 % (ref 4–15)
NEUTROPHILS # BLD AUTO: 1.3 K/UL (ref 1.8–7.7)
NEUTROPHILS NFR BLD: 38 % (ref 38–73)
NONHDLC SERPL-MCNC: 120 MG/DL
NRBC BLD-RTO: 0 /100 WBC
PLATELET # BLD AUTO: 276 K/UL (ref 150–450)
PMV BLD AUTO: 11.9 FL (ref 9.2–12.9)
POTASSIUM SERPL-SCNC: 4.3 MMOL/L (ref 3.5–5.1)
PROT SERPL-MCNC: 7.6 G/DL (ref 6–8.4)
RBC # BLD AUTO: 6.19 M/UL (ref 4.6–6.2)
SODIUM SERPL-SCNC: 138 MMOL/L (ref 136–145)
TRIGL SERPL-MCNC: 67 MG/DL (ref 30–150)
TSH SERPL DL<=0.005 MIU/L-ACNC: 1.58 UIU/ML (ref 0.4–4)
URATE SERPL-MCNC: 5.6 MG/DL (ref 3.4–7)
WBC # BLD AUTO: 3.3 K/UL (ref 3.9–12.7)

## 2023-04-19 PROCEDURE — 84443 ASSAY THYROID STIM HORMONE: CPT | Performed by: FAMILY MEDICINE

## 2023-04-19 PROCEDURE — 84550 ASSAY OF BLOOD/URIC ACID: CPT | Performed by: FAMILY MEDICINE

## 2023-04-19 PROCEDURE — 84153 ASSAY OF PSA TOTAL: CPT | Performed by: FAMILY MEDICINE

## 2023-04-19 PROCEDURE — 83036 HEMOGLOBIN GLYCOSYLATED A1C: CPT | Performed by: FAMILY MEDICINE

## 2023-04-19 PROCEDURE — 85025 COMPLETE CBC W/AUTO DIFF WBC: CPT | Performed by: FAMILY MEDICINE

## 2023-04-19 PROCEDURE — 80053 COMPREHEN METABOLIC PANEL: CPT | Performed by: FAMILY MEDICINE

## 2023-04-19 PROCEDURE — 36415 COLL VENOUS BLD VENIPUNCTURE: CPT | Mod: PO | Performed by: FAMILY MEDICINE

## 2023-04-19 PROCEDURE — 80061 LIPID PANEL: CPT | Performed by: FAMILY MEDICINE

## 2023-05-28 DIAGNOSIS — E79.0 ELEVATED URIC ACID IN BLOOD: ICD-10-CM

## 2023-05-28 DIAGNOSIS — Z87.39 HISTORY OF GOUT: ICD-10-CM

## 2023-05-28 NOTE — TELEPHONE ENCOUNTER
No care due was identified.  [facility] Embedded Care Due Messages. Reference number: [id number].   5/28/2023 6:01:11 AM CDT

## 2023-05-29 RX ORDER — ALLOPURINOL 300 MG/1
TABLET ORAL
Qty: 90 TABLET | Refills: 1 | Status: SHIPPED | OUTPATIENT
Start: 2023-05-29 | End: 2023-08-24 | Stop reason: SDUPTHER

## 2023-05-29 NOTE — TELEPHONE ENCOUNTER
Refill Decision Note   Alli Lezama  is requesting a refill authorization.  Brief Assessment and Rationale for Refill:  Approve     Medication Therapy Plan:       Medication Reconciliation Completed: No   Comments:     No Care Gaps recommended.     Note composed:10:39 AM 05/29/2023

## 2023-07-27 ENCOUNTER — TELEPHONE (OUTPATIENT)
Dept: FAMILY MEDICINE | Facility: CLINIC | Age: 43
End: 2023-07-27

## 2023-07-27 DIAGNOSIS — I10 ESSENTIAL HYPERTENSION: ICD-10-CM

## 2023-07-27 RX ORDER — AMLODIPINE BESYLATE 10 MG/1
10 TABLET ORAL DAILY
Qty: 90 TABLET | Refills: 1 | Status: SHIPPED | OUTPATIENT
Start: 2023-07-27 | End: 2023-08-24 | Stop reason: SDUPTHER

## 2023-07-27 NOTE — TELEPHONE ENCOUNTER
----- Message from Barrington Hunt sent at 7/27/2023  7:32 AM CDT -----  Type: RX Refill Request    Who Called: Self     Have you contacted your pharmacy:Yes     Refill or New Rx:Refill     RX Name and Strength:amLODIPine (NORVASC) 10 MG tablet 90 tablet     Preferred Pharmacy with phone number:[id number] - [address]   Phone: [phone]  Fax:  [phone]    Local or Mail Order: local     Would the patient rather a call back or a response via My [facility]? CALL     Best Call Back Number: [phone] (home)

## 2023-08-24 ENCOUNTER — LAB VISIT (OUTPATIENT)
Dept: LAB | Facility: HOSPITAL | Age: 43
End: 2023-08-24
Attending: FAMILY MEDICINE
Payer: COMMERCIAL

## 2023-08-24 ENCOUNTER — OFFICE VISIT (OUTPATIENT)
Dept: FAMILY MEDICINE | Facility: CLINIC | Age: 43
End: 2023-08-24
Payer: COMMERCIAL

## 2023-08-24 VITALS
WEIGHT: 201.25 LBS | OXYGEN SATURATION: 98 % | HEIGHT: 70 IN | DIASTOLIC BLOOD PRESSURE: 86 MMHG | BODY MASS INDEX: 28.81 KG/M2 | SYSTOLIC BLOOD PRESSURE: 120 MMHG | HEART RATE: 86 BPM

## 2023-08-24 DIAGNOSIS — E79.0 ELEVATED URIC ACID IN BLOOD: ICD-10-CM

## 2023-08-24 DIAGNOSIS — Z11.3 ROUTINE SCREENING FOR STI (SEXUALLY TRANSMITTED INFECTION): ICD-10-CM

## 2023-08-24 DIAGNOSIS — M25.511 ACUTE PAIN OF RIGHT SHOULDER: ICD-10-CM

## 2023-08-24 DIAGNOSIS — Z00.00 VISIT FOR WELL MAN HEALTH CHECK: Primary | ICD-10-CM

## 2023-08-24 DIAGNOSIS — Z80.42 FAMILY HISTORY OF PROSTATE CANCER IN FATHER: ICD-10-CM

## 2023-08-24 DIAGNOSIS — Z87.39 HISTORY OF GOUT: ICD-10-CM

## 2023-08-24 DIAGNOSIS — I10 ESSENTIAL HYPERTENSION: ICD-10-CM

## 2023-08-24 LAB — HIV 1+2 AB+HIV1 P24 AG SERPL QL IA: NORMAL

## 2023-08-24 PROCEDURE — 3079F PR MOST RECENT DIASTOLIC BLOOD PRESSURE 80-89 MM HG: ICD-10-PCS | Mod: CPTII,S$GLB,, | Performed by: FAMILY MEDICINE

## 2023-08-24 PROCEDURE — 3044F HG A1C LEVEL LT 7.0%: CPT | Mod: CPTII,S$GLB,, | Performed by: FAMILY MEDICINE

## 2023-08-24 PROCEDURE — 1159F PR MEDICATION LIST DOCUMENTED IN MEDICAL RECORD: ICD-10-PCS | Mod: CPTII,S$GLB,, | Performed by: FAMILY MEDICINE

## 2023-08-24 PROCEDURE — 99999 PR PBB SHADOW E&M-EST. PATIENT-LVL III: ICD-10-PCS | Mod: PBBFAC,,, | Performed by: FAMILY MEDICINE

## 2023-08-24 PROCEDURE — 99396 PREV VISIT EST AGE 40-64: CPT | Mod: S$GLB,,, | Performed by: FAMILY MEDICINE

## 2023-08-24 PROCEDURE — 36415 COLL VENOUS BLD VENIPUNCTURE: CPT | Mod: PO | Performed by: FAMILY MEDICINE

## 2023-08-24 PROCEDURE — 99999 PR PBB SHADOW E&M-EST. PATIENT-LVL III: CPT | Mod: PBBFAC,,, | Performed by: FAMILY MEDICINE

## 2023-08-24 PROCEDURE — 3008F BODY MASS INDEX DOCD: CPT | Mod: CPTII,S$GLB,, | Performed by: FAMILY MEDICINE

## 2023-08-24 PROCEDURE — 3074F PR MOST RECENT SYSTOLIC BLOOD PRESSURE < 130 MM HG: ICD-10-PCS | Mod: CPTII,S$GLB,, | Performed by: FAMILY MEDICINE

## 2023-08-24 PROCEDURE — 1159F MED LIST DOCD IN RCRD: CPT | Mod: CPTII,S$GLB,, | Performed by: FAMILY MEDICINE

## 2023-08-24 PROCEDURE — 86592 SYPHILIS TEST NON-TREP QUAL: CPT | Performed by: FAMILY MEDICINE

## 2023-08-24 PROCEDURE — 3074F SYST BP LT 130 MM HG: CPT | Mod: CPTII,S$GLB,, | Performed by: FAMILY MEDICINE

## 2023-08-24 PROCEDURE — 3044F PR MOST RECENT HEMOGLOBIN A1C LEVEL <7.0%: ICD-10-PCS | Mod: CPTII,S$GLB,, | Performed by: FAMILY MEDICINE

## 2023-08-24 PROCEDURE — 3008F PR BODY MASS INDEX (BMI) DOCUMENTED: ICD-10-PCS | Mod: CPTII,S$GLB,, | Performed by: FAMILY MEDICINE

## 2023-08-24 PROCEDURE — 99396 PR PREVENTIVE VISIT,EST,40-64: ICD-10-PCS | Mod: S$GLB,,, | Performed by: FAMILY MEDICINE

## 2023-08-24 PROCEDURE — 3079F DIAST BP 80-89 MM HG: CPT | Mod: CPTII,S$GLB,, | Performed by: FAMILY MEDICINE

## 2023-08-24 PROCEDURE — 87389 HIV-1 AG W/HIV-1&-2 AB AG IA: CPT | Performed by: FAMILY MEDICINE

## 2023-08-24 RX ORDER — AMLODIPINE BESYLATE 10 MG/1
10 TABLET ORAL DAILY
Qty: 90 TABLET | Refills: 3 | Status: SHIPPED | OUTPATIENT
Start: 2023-08-24 | End: 2023-12-14 | Stop reason: SDUPTHER

## 2023-08-24 RX ORDER — MELOXICAM 15 MG/1
15 TABLET ORAL DAILY
Qty: 30 TABLET | Refills: 1 | Status: SHIPPED | OUTPATIENT
Start: 2023-08-24

## 2023-08-24 RX ORDER — ALLOPURINOL 300 MG/1
300 TABLET ORAL DAILY
Qty: 90 TABLET | Refills: 3 | Status: SHIPPED | OUTPATIENT
Start: 2023-08-24 | End: 2023-12-14 | Stop reason: SDUPTHER

## 2023-08-24 NOTE — PROGRESS NOTES
Subjective:       Patient ID: Alli Lezama is a 43 y.o. male.    Chief Complaint: Annual Exam      Alli Lezama is a 43 y.o. male who presents today for an annual     Diet:He has cut back on his portions. He has lost weight, about 5 pounds since the last visit. Maybe down about pounds since max in EMR around 2020. No black stools, no red stools. No night sweats, no bruising or bleeding.   Exercise: he doesn't exercise. Just at work.       HTN: on amlodipine 10 mg. No chest pain, no SOB. No light headed, no dizziness.   Elevated uric acid: on allopurinol. Uric acid at goal 4/2023  Microcytic anemia: mild, has been intermittently present for >5 years. Repeat at f/u    Shoulder hurts off and on. Meloxicam helps minimally. Pain worse at night.      Labs: ordered and reviewed.   Desires STI screening today.      C-scope: at 46     PMHx: reviewed in EMR and updated  Meds: reviewed in EMR and updated  Shx: reviewed in EMR and updated  FMHx: reviewed in EMR and updated. Dad has prostate cancer, not colon cancer as previously believed. Will check PSA annually.   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 He lives with his wife (Kavitha) and daughter who was born in early 2022. No pets at home. No smokers at home.          Review of Systems   Constitutional:  Negative for chills and fever.   Gastrointestinal:  Negative for nausea and vomiting.   Musculoskeletal:  Positive for arthralgias.   Neurological:  Negative for dizziness, light-headedness and headaches.         Health Maintenance Due   Topic Date Due    COVID-19 Vaccine (4 - Pfizer series) 02/09/2022     Immunization History   Administered Date(s) Administered    COVID-19, MRNA, LN-S, PF (Pfizer) (Purple Cap) 04/20/2021, 05/11/2021, 12/15/2021    Tdap 12/30/2008, 12/10/2020       Results for orders placed or performed in visit on 04/19/23   CBC Auto Differential   Result Value Ref Range    WBC 3.30 (L) 3.90 - 12.70 K/uL    RBC 6.19 4.60 - 6.20 M/uL    Hemoglobin 13.3 (L)  14.0 - 18.0 g/dL    Hematocrit 45.3 40.0 - 54.0 %    MCV 73 (L) 82 - 98 fL    MCH 21.5 (L) 27.0 - 31.0 pg    MCHC 29.4 (L) 32.0 - 36.0 g/dL    RDW 15.7 (H) 11.5 - 14.5 %    Platelets 276 150 - 450 K/uL    MPV 11.9 9.2 - 12.9 fL    Immature Granulocytes 0.6 (H) 0.0 - 0.5 %    Gran # (ANC) 1.3 (L) 1.8 - 7.7 K/uL    Immature Grans (Abs) 0.02 0.00 - 0.04 K/uL    Lymph # 1.1 1.0 - 4.8 K/uL    Mono # 0.5 0.3 - 1.0 K/uL    Eos # 0.5 0.0 - 0.5 K/uL    Baso # 0.03 0.00 - 0.20 K/uL    nRBC 0 0 /100 WBC    Gran % 38.0 38.0 - 73.0 %    Lymph % 32.4 18.0 - 48.0 %    Mono % 13.9 4.0 - 15.0 %    Eosinophil % 14.2 (H) 0.0 - 8.0 %    Basophil % 0.9 0.0 - 1.9 %    Differential Method Automated    Comprehensive Metabolic Panel   Result Value Ref Range    Sodium 138 136 - 145 mmol/L    Potassium 4.3 3.5 - 5.1 mmol/L    Chloride 103 95 - 110 mmol/L    CO2 25 23 - 29 mmol/L    Glucose 60 (L) 70 - 110 mg/dL    BUN 13 6 - 20 mg/dL    Creatinine 1.2 0.5 - 1.4 mg/dL    Calcium 9.4 8.7 - 10.5 mg/dL    Total Protein 7.6 6.0 - 8.4 g/dL    Albumin 4.1 3.5 - 5.2 g/dL    Total Bilirubin 0.4 0.1 - 1.0 mg/dL    Alkaline Phosphatase 44 (L) 55 - 135 U/L    AST 17 10 - 40 U/L    ALT 17 10 - 44 U/L    Anion Gap 10 8 - 16 mmol/L    eGFR >60.0 >60 mL/min/1.73 m^2   Hemoglobin A1C   Result Value Ref Range    Hemoglobin A1C 5.1 4.0 - 5.6 %    Estimated Avg Glucose 100 68 - 131 mg/dL   Lipid Panel   Result Value Ref Range    Cholesterol 166 120 - 199 mg/dL    Triglycerides 67 30 - 150 mg/dL    HDL 46 40 - 75 mg/dL    LDL Cholesterol 106.6 63.0 - 159.0 mg/dL    HDL/Cholesterol Ratio 27.7 20.0 - 50.0 %    Total Cholesterol/HDL Ratio 3.6 2.0 - 5.0    Non-HDL Cholesterol 120 mg/dL   TSH   Result Value Ref Range    TSH 1.576 0.400 - 4.000 uIU/mL   PSA, Screening   Result Value Ref Range    PSA, Screen 0.97 0.00 - 4.00 ng/mL   URIC ACID   Result Value Ref Range    Uric Acid 5.6 3.4 - 7.0 mg/dL       Objective:     Vitals:    08/24/23 1010   BP: 120/86   BP  "Location: Left arm   Patient Position: Sitting   BP Method: Large (Manual)   Pulse: 86   SpO2: 98%   Weight: 91.3 kg (201 lb 4.5 oz)   Height: 5' 10" (1.778 m)        Physical Exam  Vitals and nursing note reviewed.   Constitutional:       General: He is not in acute distress.     Appearance: He is not ill-appearing, toxic-appearing or diaphoretic.      Comments: Visibly thinner   Cardiovascular:      Rate and Rhythm: Normal rate and regular rhythm.   Pulmonary:      Effort: Pulmonary effort is normal.      Breath sounds: Normal breath sounds.   Abdominal:      Palpations: Abdomen is soft.      Tenderness: There is no abdominal tenderness.   Musculoskeletal:         General: No swelling.   Skin:     Findings: No rash.   Neurological:      General: No focal deficit present.      Mental Status: He is alert.   Psychiatric:         Mood and Affect: Mood normal.         Behavior: Behavior normal.         Thought Content: Thought content normal.         Judgment: Judgment normal.         Assessment:       1. Visit for [facility] health check    2. Essential hypertension    3. History of gout    4. Elevated uric acid in blood    5. Acute pain of right shoulder    6. Routine screening for STI (sexually transmitted infection)    7. Family history of prostate cancer in father        Plan:       Continue allopurinol  Continue amlodipine  F/u April 2024, annual, labs prior.   Sti screening today per request.     Visit for [facility] health check  -     CBC Auto Differential; Future; Expected date: 08/24/2023  -     Comprehensive Metabolic Panel; Future; Expected date: 08/24/2023  -     Hemoglobin A1C; Future; Expected date: 08/24/2023  -     Lipid Panel; Future; Expected date: 08/24/2023  -     TSH; Future; Expected date: 08/24/2023  -     PSA, Screening; Future; Expected date: 08/24/2023  -     URIC ACID; Future; Expected date: 08/24/2023  -     Vitamin D; Future; Expected date: 08/24/2023    Essential hypertension  -     " amLODIPine (NORVASC) 10 MG tablet; Take 1 tablet (10 mg total) by mouth once daily.  Dispense: 90 tablet; Refill: 3  -     CBC Auto Differential; Future; Expected date: 08/24/2023  -     Comprehensive Metabolic Panel; Future; Expected date: 08/24/2023  -     TSH; Future; Expected date: 08/24/2023    History of gout  -     allopurinoL (ZYLOPRIM) 300 MG tablet; Take 1 tablet (300 mg total) by mouth once daily.  Dispense: 90 tablet; Refill: 3  -     URIC ACID; Future; Expected date: 08/24/2023    Elevated uric acid in blood  -     allopurinoL (ZYLOPRIM) 300 MG tablet; Take 1 tablet (300 mg total) by mouth once daily.  Dispense: 90 tablet; Refill: 3  -     URIC ACID; Future; Expected date: 08/24/2023    Acute pain of right shoulder  -     meloxicam (MOBIC) 15 MG tablet; Take 1 tablet (15 mg total) by mouth once daily.  Dispense: 30 tablet; Refill: 1  -     Ambulatory referral/consult to Sports Medicine; Future; Expected date: 08/31/2023    Routine screening for STI (sexually transmitted infection)  -     RPR; Future; Expected date: 08/24/2023  -     HIV 1/2 Ag/Ab (4th Gen); Future; Expected date: 08/24/2023  -     C. trachomatis/N. gonorrhoeae by AMP DNA; Future; Expected date: 08/24/2023    Family history of prostate cancer in father  -     PSA, Screening; Future; Expected date: 08/24/2023

## 2023-08-25 LAB — RPR SER QL: NORMAL

## 2023-09-05 ENCOUNTER — PATIENT MESSAGE (OUTPATIENT)
Dept: FAMILY MEDICINE | Facility: CLINIC | Age: 43
End: 2023-09-05

## 2023-09-06 NOTE — TELEPHONE ENCOUNTER
[address] [address]  Dept: [phone]  Dept Fax: [phone]  Loc: [phone]    Visit Date: 9/6/2023    Royal Steve is a 68 y.o. female who presents todayfor:  Chief Complaint   Patient presents with    6 Month Follow-Up    Pre-op Exam     Altered sinus surgery w/ Dr. Lisa Sterling 11/07/23    Hypertension    Hyperlipidemia   Had a stress test and echo   Were okay   Risk for CAD  Uncontrolled risk factors  High sugar  No chest pain   No changes in breathing  Baseline dyspnea  No dizziness  No syncope        HPI:  HPI  Past Medical History:   Diagnosis Date    Diabetes mellitus ([address])       Past Surgical History:   Procedure Laterality Date    APPENDECTOMY      TONSILLECTOMY       Family History   Problem Relation Age of Onset    Heart Disease Mother     Lung Disease Father     Stroke Father     Heart Disease Brother     Coronary Art Dis Brother     Heart Disease Brother     Pacemaker Brother      Social History     Tobacco Use    Smoking status: Never    Smokeless tobacco: Never   Substance Use Topics    Alcohol use: Never      Current Outpatient Medications   Medication Sig Dispense Refill    metoprolol succinate (TOPROL XL) 25 MG extended release tablet Take 1 tablet by mouth daily 90 tablet 3    glipiZIDE (GLUCOTROL XL) 5 MG extended release tablet Take 2 tablets by mouth 2 times daily      JANUVIA 100 MG tablet Take 1 tablet by mouth daily      metFORMIN (GLUCOPHAGE) 500 MG tablet Take 2 tablets by mouth daily      aspirin 81 MG EC tablet Take 1 tablet by mouth daily      Cholecalciferol (VITAMIN D3 PO) Take 5,000 Units by mouth daily      secukinumab (COSENTYX) 150 MG/ML SOSY Inject 2 mLs into the skin every 30 days       No current facility-administered medications for this visit.      No Known Allergies  Health Maintenance   Topic Date Due    COVID-19 Vaccine (1) Never done    Depression Screen  Never done Patient had an appointment 9/4. Appointment was cancelled.

## 2023-12-14 ENCOUNTER — OFFICE VISIT (OUTPATIENT)
Dept: FAMILY MEDICINE | Facility: CLINIC | Age: 43
End: 2023-12-14
Payer: MEDICAID

## 2023-12-14 VITALS
BODY MASS INDEX: 29.45 KG/M2 | HEIGHT: 70 IN | SYSTOLIC BLOOD PRESSURE: 138 MMHG | OXYGEN SATURATION: 99 % | DIASTOLIC BLOOD PRESSURE: 88 MMHG | TEMPERATURE: 98 F | WEIGHT: 205.69 LBS | HEART RATE: 89 BPM

## 2023-12-14 DIAGNOSIS — I10 ESSENTIAL HYPERTENSION: ICD-10-CM

## 2023-12-14 DIAGNOSIS — Z86.79 HISTORY OF CARDIAC MURMUR: Primary | ICD-10-CM

## 2023-12-14 DIAGNOSIS — Z87.39 HISTORY OF GOUT: ICD-10-CM

## 2023-12-14 DIAGNOSIS — E79.0 ELEVATED URIC ACID IN BLOOD: ICD-10-CM

## 2023-12-14 PROCEDURE — 3075F SYST BP GE 130 - 139MM HG: CPT | Mod: CPTII,,, | Performed by: FAMILY MEDICINE

## 2023-12-14 PROCEDURE — 3044F HG A1C LEVEL LT 7.0%: CPT | Mod: CPTII,,, | Performed by: FAMILY MEDICINE

## 2023-12-14 PROCEDURE — 3044F PR MOST RECENT HEMOGLOBIN A1C LEVEL <7.0%: ICD-10-PCS | Mod: CPTII,,, | Performed by: FAMILY MEDICINE

## 2023-12-14 PROCEDURE — 3079F PR MOST RECENT DIASTOLIC BLOOD PRESSURE 80-89 MM HG: ICD-10-PCS | Mod: CPTII,,, | Performed by: FAMILY MEDICINE

## 2023-12-14 PROCEDURE — 99999 PR PBB SHADOW E&M-EST. PATIENT-LVL III: ICD-10-PCS | Mod: PBBFAC,,, | Performed by: FAMILY MEDICINE

## 2023-12-14 PROCEDURE — 1159F PR MEDICATION LIST DOCUMENTED IN MEDICAL RECORD: ICD-10-PCS | Mod: CPTII,,, | Performed by: FAMILY MEDICINE

## 2023-12-14 PROCEDURE — 99214 OFFICE O/P EST MOD 30 MIN: CPT | Mod: S$PBB,,, | Performed by: FAMILY MEDICINE

## 2023-12-14 PROCEDURE — 99999 PR PBB SHADOW E&M-EST. PATIENT-LVL III: CPT | Mod: PBBFAC,,, | Performed by: FAMILY MEDICINE

## 2023-12-14 PROCEDURE — 3075F PR MOST RECENT SYSTOLIC BLOOD PRESS GE 130-139MM HG: ICD-10-PCS | Mod: CPTII,,, | Performed by: FAMILY MEDICINE

## 2023-12-14 PROCEDURE — 3079F DIAST BP 80-89 MM HG: CPT | Mod: CPTII,,, | Performed by: FAMILY MEDICINE

## 2023-12-14 PROCEDURE — 3008F BODY MASS INDEX DOCD: CPT | Mod: CPTII,,, | Performed by: FAMILY MEDICINE

## 2023-12-14 PROCEDURE — 1160F RVW MEDS BY RX/DR IN RCRD: CPT | Mod: CPTII,,, | Performed by: FAMILY MEDICINE

## 2023-12-14 PROCEDURE — 1159F MED LIST DOCD IN RCRD: CPT | Mod: CPTII,,, | Performed by: FAMILY MEDICINE

## 2023-12-14 PROCEDURE — 1160F PR REVIEW ALL MEDS BY PRESCRIBER/CLIN PHARMACIST DOCUMENTED: ICD-10-PCS | Mod: CPTII,,, | Performed by: FAMILY MEDICINE

## 2023-12-14 PROCEDURE — 99213 OFFICE O/P EST LOW 20 MIN: CPT | Mod: PBBFAC,PO | Performed by: FAMILY MEDICINE

## 2023-12-14 PROCEDURE — 3008F PR BODY MASS INDEX (BMI) DOCUMENTED: ICD-10-PCS | Mod: CPTII,,, | Performed by: FAMILY MEDICINE

## 2023-12-14 PROCEDURE — 99214 PR OFFICE/OUTPT VISIT, EST, LEVL IV, 30-39 MIN: ICD-10-PCS | Mod: S$PBB,,, | Performed by: FAMILY MEDICINE

## 2023-12-14 RX ORDER — ALLOPURINOL 300 MG/1
300 TABLET ORAL DAILY
Qty: 90 TABLET | Refills: 3 | Status: SHIPPED | OUTPATIENT
Start: 2023-12-14

## 2023-12-14 RX ORDER — AMLODIPINE BESYLATE 10 MG/1
10 TABLET ORAL DAILY
Qty: 90 TABLET | Refills: 3 | Status: SHIPPED | OUTPATIENT
Start: 2023-12-14

## 2023-12-14 NOTE — PROGRESS NOTES
"Subjective:       Patient ID: Alli Lezama is a 43 y.o. male.    Chief Complaint: OTILIA Carson is a 43 y.o. 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 This was at the request of Shara. Patient reports a history of heart murmur. I have been seeing patient since 12/10/2020. No murmur has been noted on exam since that time.     He does have a history of high blood pressure and EKG in 2020 showed possible LAE. ECHO in 2020 showed a normal systolic and diastolic function as well and no valve issues except mild pulmonic regurgitation. Specifically no aortic stenosis or regurgitation.     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     He does report he had a murmur as a kid, but I have not heard one on exam.     He can walk 2-3 miles without any chest pain or SOB. He can climb steps, no issues.     Didn't take BP medication this AM      Review of Systems   Constitutional:  Negative for chills and fever.   Respiratory:  Negative for shortness of breath.    Cardiovascular:  Negative for chest pain and leg swelling.   Gastrointestinal:  Negative for nausea and vomiting.   Neurological:  Negative for dizziness, light-headedness and headaches.           Objective:     Vitals:    12/14/23 0732 12/14/23 0739   BP: (!) 148/104 138/88   BP Location: Right arm    Patient Position: Sitting    BP Method: Medium (Manual)    Pulse: 89    Temp: 98.2 °F (36.8 °C)    TempSrc: Oral    SpO2: 99%    Weight: 93.3 kg (205 lb 11 oz)    Height: 5' 10" (1.778 m)         Physical Exam  Constitutional:       General: He is not in acute distress.     Appearance: He is not ill-appearing, toxic-appearing or diaphoretic.   Cardiovascular:      Rate and Rhythm: Normal rate and regular rhythm.      Comments: No murmur in seated, standing, or squatting position noted on exam today.   Pulmonary:      Effort: Pulmonary effort is normal.      Breath sounds: Normal breath sounds.   Musculoskeletal:       "   General: No swelling.   Neurological:      General: No focal deficit present.      Mental Status: He is alert.   Psychiatric:         Mood and Affect: Mood normal.         Behavior: Behavior normal.         Thought Content: Thought content normal.         Judgment: Judgment normal.         Assessment:       1. History of cardiac murmur    2. Essential hypertension    3. History of gout    4. Elevated uric acid in blood        Plan:       Normal exam  No symptoms  No murmur noted  Recent echo was essentially normal  Cleared by cardiology in 2020  Continue BP medication, coupon given    I see no reason to limit patient's ability to drive.     History of cardiac murmur    Essential hypertension  -     amLODIPine (NORVASC) 10 MG tablet; Take 1 tablet (10 mg total) by mouth once daily.  Dispense: 90 tablet; Refill: 3    History of gout  -     allopurinoL (ZYLOPRIM) 300 MG tablet; Take 1 tablet (300 mg total) by mouth once daily.  Dispense: 90 tablet; Refill: 3    Elevated uric acid in blood  -     allopurinoL (ZYLOPRIM) 300 MG tablet; Take 1 tablet (300 mg total) by mouth once daily.  Dispense: 90 tablet; Refill: 3            Warning signs discussed, patient to call with any further issues or worsening of symptoms. Above note may have utilized dictation, please excuse any transcription errors.

## 2024-04-08 DIAGNOSIS — I10 ESSENTIAL HYPERTENSION: ICD-10-CM

## 2024-04-08 NOTE — TELEPHONE ENCOUNTER
Care Due:                  Date            Visit Type   Department     Provider  --------------------------------------------------------------------------------                                EP -                              PRIMARY      [facility] FAMILY  Last Visit: 12-      CARE ([facility])   [facility]       Joshua Polk                              EP -                              PRIMARY      [facility]  Next Visit: 04-      CARE ([facility])   MEDICINE       Joshua  Madrecha                                                            Last  Test          Frequency    Reason                     Performed    Due Date  --------------------------------------------------------------------------------    CBC.........  12 months..  allopurinoL, diclofenac,   04- 04-                             meloxicam................    CMP.........  12 months..  allopurinoL, diclofenac,   04- 04-                             meloxicam................    Uric Acid...  12 months..  allopurinoL..............  04- 04-    Health [facility] Embedded Care Due Messages. Reference number: [id number].   4/08/2024 3:52:15 PM CDT   Outpatient psychiatric progress note- Virtual    Patient interviewed by [employer] due to coronavirus pandemic.  She provides consent for this interview modality.    CC:  \"I've been doing okay\"    Subjective:  Patient seen today virtually for follow-up visit. Patient states above chief complaint. She did have some difficulty emotionally as she had to euthanize another cat last week; however she was able to utilize coping mechanisms she has learned and worked on in therapy. Therapy is going well overall, she finds it helpful. Her mood has been \"good, actually.\" She is taking trazodone 50mg nightly, does not always need it and generally feels rested when she wakes up. Her energy has been good throughout the day. She has only taken ativan once for acute anxiety. Her anxiety overall has been very manageable without extreme rumination. She denies any recent severely depressed mood, denies SI, passive or active. Denies significant changes in concentration, appetite. Work has been busier, which does help the days go faster. Summer has been good overall. Next month, she and her  are going to [place], she is also looking forward to a trip to [facility] with her best friend and best friend's .    Mental Status Exam:  General Appearance: 39-year-old female, short hair, glasses, appears younger than stated age, good grooming/hygiene  Behavior/Attitude: calm, cooperative  Motor: No abnormal movements however difficult to assess due to video interview  Speech: RRR, nl volume, non-pressured  Mood: \"good\"  Affect: Euthymic  Thought Content:  Denies current active or passive SI. Denies HI. Denies obsessions, paranoia, and delutional ideation.   Thought Process: Linear, logical, goal-directed  Perception: Denies AH and VH.    Insight: fair  Judgment: fair  Cognition: grossly intact.    Assessment:   39 year old female with history of depression, anxiety, insomnia who presents for follow-up.  Endorses her  psychiatric symptoms are stable on medication regimen, although she notes some fluctuating anxiety typically related to stress.  Denies adverse effects on current medications.  She is very forward thinking and goal-directed, enjoys her job as a .    Currently denying any active or passive SI. Patient does not appear to be an immediate risk to harm others, self, and presents with low difficulty with self care.     Diagnosis:   Major Depressive Disorder, in full remission  RUTHIE with panic attacks    Plan:     -For mood and anxiety symptoms:    Continue Effexor  mg daily    Discussed adverse effects medication including but not limited to GI distress, increased anxiety.  Previous or other adverse effects discussed by another provider.  Patient denies adverse effects at this time.    -For mood augmentation:    Continue lamotrigine 200 mg nightly    Patient acknowledges known side effect of rash that can become life-threatening.  She denies experiencing such side effects in the past.    -For anxiety symptoms:    Continue Gabapentin 300 mg qam and 600 mg qpm    Patient previously informed medication is off label for anxiety.  She denies adverse effects medication including daytime sedation, cognitive slowing.    -For insomnia:    Continue Seroquel XL 50 mg nightly    Continue trazodone 50-75mg nightly PRN insomnia.    Discussed with the patient that this is being used off label to help with her insomnia.  Risks of psychotropic therapy including metabolic syndrome, weight gain, HLD, sedation, EPS previously explained to patient. Benefits and alternatives discussed as well. Patient noted clear understanding and willingness to proceed with therapy.    Reviewed labs from 2/4/2021 per provider at [facility]: HbA1c 5.2, triglyceride 177 (high).  She reports she has chronic high cholesterol due to familial disorder.  We discussed continuing to monitor fasting labs every 6 months to 1 year.    -For  acute anxiety and panic attacks:    Patient reports she rarely uses prescription for Lorazepam 0.5 mg daily PRN.  Reports she takes 2-3x per month. She denies adverse effects of medications and acknowledges known risks. No irregularities.    Patient denies needing a new prescription and she uses rarely.    - Recommend outpatient psychotherapy on a regular basis with established provider. Pt see's Debbie Born at [facility] every 2-3 weeks.     - Pt. informed to contact family members, psychiatrist, dial 911, or seek emergent psychiatric evaluation in the occurrence of new or worsening symptoms including safety concerns such as danger to self, others, or inability to self-care. Discussed assessment and plan with patient. Pt. expressed understanding of risks, benefits, alternatives of treatment and agreed.     F/U in 3 months for 30 min.    Total time spent with patient: 15 minutes  Time spent in psychotherapy: 5 minutes  Techniques employed: Active listening, empathic validation and support  Additional time spent in E&M services which are based on medical complexity.    Steph Devries MD  Psychiatry PGY4

## 2024-04-09 RX ORDER — AMLODIPINE BESYLATE 10 MG/1
10 TABLET ORAL DAILY
Qty: 90 TABLET | Refills: 2 | Status: SHIPPED | OUTPATIENT
Start: 2024-04-09 | End: 2024-04-25 | Stop reason: SDUPTHER

## 2024-04-09 NOTE — TELEPHONE ENCOUNTER
Provider Staff:  Action required for this patient    Requires labs      Please see care gap opportunities below in Care Due Message.    Thanks!  [facility]     Appointments      Date Provider   Last Visit   12/14/2023 Joshua Polk,    Next Visit   4/25/2024 Joshua Polk DO     Refill Decision Note   Alli Lezama  is requesting a refill authorization.  Brief Assessment and Rationale for Refill:  Approve     Medication Therapy Plan:         Comments:     Note composed:1:56 PM 04/09/2024             Appointments     Last Visit   12/14/2023 Joshua Polk DO   Next Visit   4/25/2024 Joshua Polk DO

## 2024-04-20 ENCOUNTER — LAB VISIT (OUTPATIENT)
Dept: LAB | Facility: HOSPITAL | Age: 44
End: 2024-04-20
Attending: FAMILY MEDICINE
Payer: COMMERCIAL

## 2024-04-20 DIAGNOSIS — E79.0 ELEVATED URIC ACID IN BLOOD: ICD-10-CM

## 2024-04-20 DIAGNOSIS — Z80.42 FAMILY HISTORY OF PROSTATE CANCER IN FATHER: ICD-10-CM

## 2024-04-20 DIAGNOSIS — Z00.00 VISIT FOR WELL MAN HEALTH CHECK: ICD-10-CM

## 2024-04-20 DIAGNOSIS — I10 ESSENTIAL HYPERTENSION: ICD-10-CM

## 2024-04-20 DIAGNOSIS — Z87.39 HISTORY OF GOUT: ICD-10-CM

## 2024-04-20 PROCEDURE — 80053 COMPREHEN METABOLIC PANEL: CPT | Performed by: FAMILY MEDICINE

## 2024-04-20 PROCEDURE — 80061 LIPID PANEL: CPT | Performed by: FAMILY MEDICINE

## 2024-04-20 PROCEDURE — 85025 COMPLETE CBC W/AUTO DIFF WBC: CPT | Performed by: FAMILY MEDICINE

## 2024-04-20 PROCEDURE — 84443 ASSAY THYROID STIM HORMONE: CPT | Performed by: FAMILY MEDICINE

## 2024-04-20 PROCEDURE — 82306 VITAMIN D 25 HYDROXY: CPT | Performed by: FAMILY MEDICINE

## 2024-04-20 PROCEDURE — 84153 ASSAY OF PSA TOTAL: CPT | Performed by: FAMILY MEDICINE

## 2024-04-20 PROCEDURE — 84550 ASSAY OF BLOOD/URIC ACID: CPT | Performed by: FAMILY MEDICINE

## 2024-04-20 PROCEDURE — 36415 COLL VENOUS BLD VENIPUNCTURE: CPT | Mod: PO | Performed by: FAMILY MEDICINE

## 2024-04-20 PROCEDURE — 83036 HEMOGLOBIN GLYCOSYLATED A1C: CPT | Performed by: FAMILY MEDICINE

## 2024-04-21 LAB
25(OH)D3+25(OH)D2 SERPL-MCNC: 35 NG/ML (ref 30–96)
ALBUMIN SERPL BCP-MCNC: 4 G/DL (ref 3.5–5.2)
ALP SERPL-CCNC: 47 U/L (ref 55–135)
ALT SERPL W/O P-5'-P-CCNC: 12 U/L (ref 10–44)
ANION GAP SERPL CALC-SCNC: 9 MMOL/L (ref 8–16)
AST SERPL-CCNC: 17 U/L (ref 10–40)
BASOPHILS # BLD AUTO: 0.04 K/UL (ref 0–0.2)
BASOPHILS NFR BLD: 1.2 % (ref 0–1.9)
BILIRUB SERPL-MCNC: 0.4 MG/DL (ref 0.1–1)
BUN SERPL-MCNC: 12 MG/DL (ref 6–20)
CALCIUM SERPL-MCNC: 9.5 MG/DL (ref 8.7–10.5)
CHLORIDE SERPL-SCNC: 103 MMOL/L (ref 95–110)
CHOLEST SERPL-MCNC: 151 MG/DL (ref 120–199)
CHOLEST/HDLC SERPL: 3.2 {RATIO} (ref 2–5)
CO2 SERPL-SCNC: 24 MMOL/L (ref 23–29)
COMPLEXED PSA SERPL-MCNC: 1.2 NG/ML (ref 0–4)
CREAT SERPL-MCNC: 1.3 MG/DL (ref 0.5–1.4)
DIFFERENTIAL METHOD BLD: ABNORMAL
EOSINOPHIL # BLD AUTO: 0.3 K/UL (ref 0–0.5)
EOSINOPHIL NFR BLD: 8.3 % (ref 0–8)
ERYTHROCYTE [DISTWIDTH] IN BLOOD BY AUTOMATED COUNT: 15.1 % (ref 11.5–14.5)
EST. GFR  (NO RACE VARIABLE): >60 ML/MIN/1.73 M^2
ESTIMATED AVG GLUCOSE: 105 MG/DL (ref 68–131)
GLUCOSE SERPL-MCNC: 99 MG/DL (ref 70–110)
HBA1C MFR BLD: 5.3 % (ref 4–5.6)
HCT VFR BLD AUTO: 43.7 % (ref 40–54)
HDLC SERPL-MCNC: 47 MG/DL (ref 40–75)
HDLC SERPL: 31.1 % (ref 20–50)
HGB BLD-MCNC: 13 G/DL (ref 14–18)
IMM GRANULOCYTES # BLD AUTO: 0.01 K/UL (ref 0–0.04)
IMM GRANULOCYTES NFR BLD AUTO: 0.3 % (ref 0–0.5)
LDLC SERPL CALC-MCNC: 92 MG/DL (ref 63–159)
LYMPHOCYTES # BLD AUTO: 1 K/UL (ref 1–4.8)
LYMPHOCYTES NFR BLD: 31.3 % (ref 18–48)
MCH RBC QN AUTO: 21.6 PG (ref 27–31)
MCHC RBC AUTO-ENTMCNC: 29.7 G/DL (ref 32–36)
MCV RBC AUTO: 73 FL (ref 82–98)
MONOCYTES # BLD AUTO: 0.4 K/UL (ref 0.3–1)
MONOCYTES NFR BLD: 10.7 % (ref 4–15)
NEUTROPHILS # BLD AUTO: 1.6 K/UL (ref 1.8–7.7)
NEUTROPHILS NFR BLD: 48.2 % (ref 38–73)
NONHDLC SERPL-MCNC: 104 MG/DL
NRBC BLD-RTO: 0 /100 WBC
PLATELET # BLD AUTO: 373 K/UL (ref 150–450)
PMV BLD AUTO: 12.4 FL (ref 9.2–12.9)
POTASSIUM SERPL-SCNC: 3.7 MMOL/L (ref 3.5–5.1)
PROT SERPL-MCNC: 7.8 G/DL (ref 6–8.4)
RBC # BLD AUTO: 6.03 M/UL (ref 4.6–6.2)
SODIUM SERPL-SCNC: 136 MMOL/L (ref 136–145)
TRIGL SERPL-MCNC: 60 MG/DL (ref 30–150)
TSH SERPL DL<=0.005 MIU/L-ACNC: 1.51 UIU/ML (ref 0.4–4)
URATE SERPL-MCNC: 7.6 MG/DL (ref 3.4–7)
WBC # BLD AUTO: 3.26 K/UL (ref 3.9–12.7)

## 2024-04-25 ENCOUNTER — PATIENT OUTREACH (OUTPATIENT)
Dept: ADMINISTRATIVE | Facility: HOSPITAL | Age: 44
End: 2024-04-25
Payer: COMMERCIAL

## 2024-04-25 ENCOUNTER — OFFICE VISIT (OUTPATIENT)
Dept: FAMILY MEDICINE | Facility: CLINIC | Age: 44
End: 2024-04-25
Payer: COMMERCIAL

## 2024-04-25 VITALS
TEMPERATURE: 98 F | HEART RATE: 100 BPM | DIASTOLIC BLOOD PRESSURE: 86 MMHG | HEIGHT: 70 IN | WEIGHT: 204.81 LBS | SYSTOLIC BLOOD PRESSURE: 120 MMHG | OXYGEN SATURATION: 99 % | BODY MASS INDEX: 29.32 KG/M2

## 2024-04-25 DIAGNOSIS — Z00.00 VISIT FOR WELL MAN HEALTH CHECK: Primary | ICD-10-CM

## 2024-04-25 DIAGNOSIS — Z87.39 HISTORY OF GOUT: ICD-10-CM

## 2024-04-25 DIAGNOSIS — B96.89 ACUTE BACTERIAL SINUSITIS: ICD-10-CM

## 2024-04-25 DIAGNOSIS — J01.90 ACUTE BACTERIAL SINUSITIS: ICD-10-CM

## 2024-04-25 DIAGNOSIS — I10 ESSENTIAL HYPERTENSION: ICD-10-CM

## 2024-04-25 PROCEDURE — 1159F MED LIST DOCD IN RCRD: CPT | Mod: CPTII,S$GLB,, | Performed by: FAMILY MEDICINE

## 2024-04-25 PROCEDURE — 3074F SYST BP LT 130 MM HG: CPT | Mod: CPTII,S$GLB,, | Performed by: FAMILY MEDICINE

## 2024-04-25 PROCEDURE — 1160F RVW MEDS BY RX/DR IN RCRD: CPT | Mod: CPTII,S$GLB,, | Performed by: FAMILY MEDICINE

## 2024-04-25 PROCEDURE — 3079F DIAST BP 80-89 MM HG: CPT | Mod: CPTII,S$GLB,, | Performed by: FAMILY MEDICINE

## 2024-04-25 PROCEDURE — 3008F BODY MASS INDEX DOCD: CPT | Mod: CPTII,S$GLB,, | Performed by: FAMILY MEDICINE

## 2024-04-25 PROCEDURE — 3044F HG A1C LEVEL LT 7.0%: CPT | Mod: CPTII,S$GLB,, | Performed by: FAMILY MEDICINE

## 2024-04-25 PROCEDURE — 99999 PR PBB SHADOW E&M-EST. PATIENT-LVL IV: CPT | Mod: PBBFAC,,, | Performed by: FAMILY MEDICINE

## 2024-04-25 PROCEDURE — 99396 PREV VISIT EST AGE 40-64: CPT | Mod: S$GLB,,, | Performed by: FAMILY MEDICINE

## 2024-04-25 RX ORDER — AMOXICILLIN AND CLAVULANATE POTASSIUM 875; 125 MG/1; MG/1
1 TABLET, FILM COATED ORAL EVERY 12 HOURS
Qty: 14 TABLET | Refills: 0 | Status: SHIPPED | OUTPATIENT
Start: 2024-04-25 | End: 2024-05-02

## 2024-04-25 RX ORDER — AMLODIPINE BESYLATE 10 MG/1
10 TABLET ORAL DAILY
Qty: 90 TABLET | Refills: 2 | Status: SHIPPED | OUTPATIENT
Start: 2024-04-25

## 2024-04-25 RX ORDER — FLUTICASONE PROPIONATE 50 MCG
1 SPRAY, SUSPENSION (ML) NASAL 2 TIMES DAILY
Qty: 16 G | Refills: 0 | Status: SHIPPED | OUTPATIENT
Start: 2024-04-25 | End: 2024-05-25

## 2024-04-25 NOTE — PATIENT INSTRUCTIONS
Continue amlodipine for BP  Continue not drinking alcohol  Continue otc daily Vitamin D    Already not taking allopurinol. Can try to just stop  Goal <7.0, above that slightly  Low purine diet  If gout flares occur again, may need to restart, contact me first on directions, will need to restart with colchicine for 90 days.     Monitor anemia annually or so, likely thalassemia    Check nasal sprays at home  DO NOT TAKE AFRIN FOR LONGER THEN 3 DAYS  Flonase BID  Abx  If congestion persisting, consider steroid taper or ENT

## 2024-04-25 NOTE — TELEPHONE ENCOUNTER
No care due was identified.  [facility] Embedded Care Due Messages. Reference number: [id number].   4/25/2024 3:19:00 PM CDT

## 2024-04-25 NOTE — PROGRESS NOTES
Population Health Chart Review & Patient Outreach Details      Additional [facility] Health Notes:    Pt declined flu vaccine 04/25/2024.           Updates Requested / Reviewed:      Updated Care Coordination Note, Care Everywhere, and Immunizations Reconciliation Completed or Queried: [place]         Health Maintenance Topics Overdue:      [facility] Score: 0     Patient is not due for any topics at this time.                       Health Maintenance Topic(s) Outreach Outcomes & Actions Taken:

## 2024-04-26 RX ORDER — FLUTICASONE PROPIONATE 50 MCG
SPRAY, SUSPENSION (ML) NASAL
Qty: 48 G | OUTPATIENT
Start: 2024-04-26

## 2024-04-26 NOTE — TELEPHONE ENCOUNTER
Refill Decision Note   Alli Lezama  is requesting a refill authorization.  Brief Assessment and Rationale for Refill:  Quick Discontinue     Medication Therapy Plan:  Receipt confirmed by pharmacy (4/25/2024  3:15 PM CDT)      Comments:     Note composed:8:13 AM 04/26/2024

## 2024-09-17 DIAGNOSIS — B96.89 ACUTE BACTERIAL SINUSITIS: ICD-10-CM

## 2024-09-17 DIAGNOSIS — J01.90 ACUTE BACTERIAL SINUSITIS: ICD-10-CM

## 2024-09-17 NOTE — TELEPHONE ENCOUNTER
No care due was identified.  [facility] Embedded Care Due Messages. Reference number: [id number].   9/17/2024 2:07:48 PM CDT

## 2024-09-18 RX ORDER — FLUTICASONE PROPIONATE 50 MCG
1 SPRAY, SUSPENSION (ML) NASAL 2 TIMES DAILY
Qty: 48 G | Refills: 2 | Status: SHIPPED | OUTPATIENT
Start: 2024-09-18

## 2024-09-18 NOTE — TELEPHONE ENCOUNTER
Refill Routing Note   Medication(s) are not appropriate for processing by [facility] for the following reason(s):        No active prescription written by provider:  past end date on previous order    [facility] action(s):  Defer      Medication Therapy Plan:         Appointments  past 12m or future 3m with PCP    Date Provider   Last Visit   2024 Joshua Polk DO   Next Visit   2024 Joshua Polk DO   ED visits in past 90 days: 0        Note composed:10:46 AM 2024

## 2024-09-24 ENCOUNTER — TELEPHONE (OUTPATIENT)
Dept: FAMILY MEDICINE | Facility: CLINIC | Age: 44
End: 2024-09-24
Payer: COMMERCIAL

## 2024-11-04 ENCOUNTER — OFFICE VISIT (OUTPATIENT)
Dept: FAMILY MEDICINE | Facility: CLINIC | Age: 44
End: 2024-11-04
Payer: COMMERCIAL

## 2024-11-04 ENCOUNTER — HOSPITAL ENCOUNTER (OUTPATIENT)
Dept: RADIOLOGY | Facility: HOSPITAL | Age: 44
Discharge: HOME | End: 2024-11-04
Attending: FAMILY MEDICINE
Payer: COMMERCIAL

## 2024-11-04 VITALS
BODY MASS INDEX: 29.89 KG/M2 | WEIGHT: 208.75 LBS | DIASTOLIC BLOOD PRESSURE: 82 MMHG | OXYGEN SATURATION: 98 % | HEIGHT: 70 IN | SYSTOLIC BLOOD PRESSURE: 122 MMHG | HEART RATE: 92 BPM

## 2024-11-04 DIAGNOSIS — R07.9 CHEST PAIN, UNSPECIFIED TYPE: ICD-10-CM

## 2024-11-04 DIAGNOSIS — I10 ESSENTIAL HYPERTENSION: Primary | ICD-10-CM

## 2024-11-04 DIAGNOSIS — R07.81 RIB PAIN: ICD-10-CM

## 2024-11-04 DIAGNOSIS — E55.9 VITAMIN D DEFICIENCY: ICD-10-CM

## 2024-11-04 DIAGNOSIS — R06.02 SOB (SHORTNESS OF BREATH): ICD-10-CM

## 2024-11-04 DIAGNOSIS — Z80.42 FAMILY HISTORY OF PROSTATE CANCER IN FATHER: ICD-10-CM

## 2024-11-04 DIAGNOSIS — Z87.39 HISTORY OF GOUT: ICD-10-CM

## 2024-11-04 DIAGNOSIS — Z00.00 VISIT FOR WELL MAN HEALTH CHECK: ICD-10-CM

## 2024-11-04 PROCEDURE — 71046 X-RAY EXAM CHEST 2 VIEWS: CPT | Mod: 26,,, | Performed by: RADIOLOGY

## 2024-11-04 PROCEDURE — 3074F SYST BP LT 130 MM HG: CPT | Mod: CPTII,S$GLB,, | Performed by: FAMILY MEDICINE

## 2024-11-04 PROCEDURE — 1159F MED LIST DOCD IN RCRD: CPT | Mod: CPTII,S$GLB,, | Performed by: FAMILY MEDICINE

## 2024-11-04 PROCEDURE — 71046 X-RAY EXAM CHEST 2 VIEWS: CPT | Mod: TC,FY,PO

## 2024-11-04 PROCEDURE — 99999 PR PBB SHADOW E&M-EST. PATIENT-LVL IV: CPT | Mod: PBBFAC,,, | Performed by: FAMILY MEDICINE

## 2024-11-04 PROCEDURE — 3079F DIAST BP 80-89 MM HG: CPT | Mod: CPTII,S$GLB,, | Performed by: FAMILY MEDICINE

## 2024-11-04 PROCEDURE — 93005 ELECTROCARDIOGRAM TRACING: CPT | Mod: S$GLB,,, | Performed by: FAMILY MEDICINE

## 2024-11-04 PROCEDURE — 1160F RVW MEDS BY RX/DR IN RCRD: CPT | Mod: CPTII,S$GLB,, | Performed by: FAMILY MEDICINE

## 2024-11-04 PROCEDURE — 3008F BODY MASS INDEX DOCD: CPT | Mod: CPTII,S$GLB,, | Performed by: FAMILY MEDICINE

## 2024-11-04 PROCEDURE — 3044F HG A1C LEVEL LT 7.0%: CPT | Mod: CPTII,S$GLB,, | Performed by: FAMILY MEDICINE

## 2024-11-04 PROCEDURE — 99214 OFFICE O/P EST MOD 30 MIN: CPT | Mod: S$GLB,,, | Performed by: FAMILY MEDICINE

## 2024-11-04 PROCEDURE — 93010 ELECTROCARDIOGRAM REPORT: CPT | Mod: S$GLB,,, | Performed by: INTERNAL MEDICINE

## 2024-11-04 RX ORDER — DICLOFENAC SODIUM 10 MG/G
2 GEL TOPICAL 4 TIMES DAILY
Qty: 100 G | Refills: 0 | Status: SHIPPED | OUTPATIENT
Start: 2024-11-04

## 2024-11-04 RX ORDER — IBUPROFEN 600 MG/1
600 TABLET ORAL
Qty: 30 TABLET | Refills: 1 | Status: SHIPPED | OUTPATIENT
Start: 2024-11-04

## 2024-11-04 RX ORDER — AMLODIPINE BESYLATE 10 MG/1
10 TABLET ORAL DAILY
Qty: 90 TABLET | Refills: 2 | Status: SHIPPED | OUTPATIENT
Start: 2024-11-04

## 2024-11-04 NOTE — PROGRESS NOTES
"Subjective:       Patient ID: Alli Lezama is a 44 y.o. male.    Chief Complaint: Follow-up and Chest Pain    Alli is a 44 y.o. male who presents today for f/u    HTN: on amlodipine 10 mg. No light headed, no dizziness.   Elevated uric acid: uric acid slightly high 4/20/2024. Not taking allopurinol consistently, I stopped at that visit. Since then, no gout flares. No gout flares since at least April 2024.   Microcytic anemia: mild, has been intermittently present for >5 years. Repeat in 6-12 months.     He had chest pain, one week ago. He states that it felt "tight in his chest." He reports that this is with activity and with rest. He states that the chest pain is mid sternal. He denies any radiation. He does report some SOB that is new also. He states that this started last week. No coughing. No nasal congestion. No wheezing. Reports SOB occurs more when he is playing with his kids. 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 No exercise. Hasn't climbed any stairs in months. Had cardiac evaluation in 2020 and was cleared at that time.       Review of Systems   Constitutional:  Negative for chills and fever.   Respiratory:  Positive for shortness of breath. Negative for chest tightness.    Cardiovascular:  Positive for chest pain. Negative for palpitations and leg swelling.   Gastrointestinal:  Negative for nausea and vomiting.   Neurological:  Negative for dizziness and headaches.               Objective:     Vitals:    11/04/24 1419   BP: 122/82   BP Location: Left arm   Patient Position: Sitting   Pulse: 92   SpO2: 98%   Weight: 94.7 kg (208 lb 12.4 oz)   Height: 5' 10" (1.778 m)        Physical Exam  Vitals and nursing note reviewed.   Constitutional:       General: He is not in acute distress.     Appearance: He is not ill-appearing, toxic-appearing or diaphoretic.   Cardiovascular:      Rate and Rhythm: Normal rate and regular rhythm.   Pulmonary:      Effort: Pulmonary effort is normal.      Breath " sounds: Normal breath sounds.   Chest:          Comments: Area of pain and TTP  Abdominal:      Palpations: Abdomen is soft.      Tenderness: There is no abdominal tenderness.   Musculoskeletal:         General: No swelling.   Neurological:      General: No focal deficit present.      Mental Status: He is alert.   Psychiatric:         Mood and Affect: Mood normal.         Behavior: Behavior normal.         Thought Content: Thought content normal.         Judgment: Judgment normal.         Assessment:       1. Essential hypertension    2. Family history of prostate cancer in father    3. BMI 29.0-29.9,adult    4. Vitamin D deficiency    5. History of gout    6. Chest pain, unspecified type    7. SOB (shortness of breath)    8. Rib pain    9. Visit for well man health check        Plan:       Continue amlodipine  Monitor uric acid  Low purine diet    Chest pain, suspect secondary to rib dysfunction/costochondritis  There is reproducible TTP of the ribs, on the left side of the sternum  Given SOB, will get EKG and CXR  If normal, try oral and topical nsaids  If not improving or worsening, will obtain stress test  On EKG review, V2 looks different from last EKG. Will wait for formal cardiology read. If any issues noted, will get stress test.     F/u 6 months, labs prior.     Essential hypertension  -     amLODIPine (NORVASC) 10 MG tablet; Take 1 tablet (10 mg total) by mouth once daily.  Dispense: 90 tablet; Refill: 2    Family history of prostate cancer in father  -     PSA, Screening; Future; Expected date: 11/04/2024    BMI 29.0-29.9,adult    Vitamin D deficiency  -     Vitamin D; Future; Expected date: 11/04/2024    History of gout  -     URIC ACID; Future; Expected date: 11/04/2024    Chest pain, unspecified type  -     IN OFFICE EKG 12-LEAD (to Muse)  -     X-Ray Chest PA And Lateral; Future; Expected date: 11/04/2024    SOB (shortness of breath)  -     IN OFFICE EKG 12-LEAD (to Muse)  -     X-Ray Chest PA And  Lateral; Future; Expected date: 11/04/2024    Rib pain  -     ibuprofen (ADVIL,MOTRIN) 600 MG tablet; Take 1 tablet (600 mg total) by mouth 3 (three) times daily with meals.  Dispense: 30 tablet; Refill: 1  -     diclofenac sodium (VOLTAREN ARTHRITIS PAIN) 1 % Gel; Apply 2 g topically 4 (four) times daily.  Dispense: 100 g; Refill: 0  -     X-Ray Chest PA And Lateral; Future; Expected date: 11/04/2024    Visit for well [place] health check  -     PSA, Screening; Future; Expected date: 11/04/2024  -     CBC Auto Differential; Future; Expected date: 11/04/2024  -     Comprehensive Metabolic Panel; Future; Expected date: 11/04/2024  -     Hemoglobin A1C; Future; Expected date: 11/04/2024  -     Lipid Panel; Future; Expected date: 11/04/2024  -     TSH; Future; Expected date: 11/04/2024  -     URIC ACID; Future; Expected date: 11/04/2024  -     Vitamin D; Future; Expected date: 11/04/2024

## 2024-11-04 NOTE — PATIENT INSTRUCTIONS
Continue amlodipine  Monitor uric acid  Low purine diet    Chest pain, suspect secondary to rib dysfunction/costochondritis  There is reproducible TTP of the ribs, on the left side of the sternum  Given SOB, will get EKG and CXR  If normal, try oral and topical nsaids  If not improving or worsening, will obtain stress test    F/u 6 months, labs prior.

## 2024-11-05 LAB
OHS QRS DURATION: 84 MS
OHS QTC CALCULATION: 404 MS

## 2025-04-03 DIAGNOSIS — E79.0 ELEVATED URIC ACID IN BLOOD: ICD-10-CM

## 2025-04-03 DIAGNOSIS — Z87.39 HISTORY OF GOUT: ICD-10-CM

## 2025-04-03 RX ORDER — ALLOPURINOL 300 MG/1
300 TABLET ORAL
Qty: 90 TABLET | Refills: 3 | OUTPATIENT
Start: 2025-04-03

## 2025-04-03 NOTE — TELEPHONE ENCOUNTER
Refill Decision Note   Alli Lezama  is requesting a refill authorization.  Brief Assessment and Rationale for Refill:  Quick Discontinue     Medication Therapy Plan:  Med d/c on 04/25/24 by PCP; HERNÁNC. FLOS 05/01/25      Comments:     Note composed:10:38 AM 04/03/2025

## 2025-04-03 NOTE — TELEPHONE ENCOUNTER
Care Due:                  Date            Visit Type   Department     Provider  --------------------------------------------------------------------------------                                EP -                              PRIMARY      [facility] FAMILY  Last Visit: 11-      CARE ([facility])   [facility]       Joshua Polk                              EP -                              PRIMARY      [facility]  Next Visit: 05-      CARE ([facility])   [facility]       [facility]                                                            Last  Test          Frequency    Reason                     Performed    Due Date  --------------------------------------------------------------------------------    CBC.........  12 months..  diclofenac, ibuprofen....  04- 04-    Cr..........  12 months..  diclofenac, ibuprofen....  04- 04-    Health [facility] Embedded Care Due Messages. Reference number: [id number].   4/03/2025 8:17:34 AM CDT

## 2025-05-01 ENCOUNTER — TELEPHONE (OUTPATIENT)
Dept: FAMILY MEDICINE | Facility: CLINIC | Age: 45
End: 2025-05-01
Payer: COMMERCIAL

## 2025-05-01 ENCOUNTER — PATIENT MESSAGE (OUTPATIENT)
Dept: FAMILY MEDICINE | Facility: CLINIC | Age: 45
End: 2025-05-01
Payer: COMMERCIAL

## 2025-05-01 DIAGNOSIS — Z87.39 HISTORY OF GOUT: ICD-10-CM

## 2025-05-01 DIAGNOSIS — Z00.00 VISIT FOR WELL MAN HEALTH CHECK: Primary | ICD-10-CM

## 2025-05-01 DIAGNOSIS — Z80.42 FAMILY HISTORY OF PROSTATE CANCER IN FATHER: ICD-10-CM

## 2025-05-01 DIAGNOSIS — E55.9 VITAMIN D DEFICIENCY: ICD-10-CM

## 2025-05-02 ENCOUNTER — RESULTS FOLLOW-UP (OUTPATIENT)
Dept: FAMILY MEDICINE | Facility: CLINIC | Age: 45
End: 2025-05-02

## 2025-05-02 LAB
25(OH)D3+25(OH)D2 SERPL-MCNC: 37 NG/ML (ref 30–100)
ALBUMIN SERPL-MCNC: 4.2 G/DL (ref 3.6–5.1)
ALBUMIN/GLOB SERPL: 1.3 (CALC) (ref 1–2.5)
ALP SERPL-CCNC: 40 U/L (ref 36–130)
ALT SERPL-CCNC: 9 U/L (ref 9–46)
AST SERPL-CCNC: 13 U/L (ref 10–40)
BASOPHILS # BLD AUTO: 10 CELLS/UL (ref 0–200)
BASOPHILS NFR BLD AUTO: 0.3 %
BILIRUB SERPL-MCNC: 0.5 MG/DL (ref 0.2–1.2)
BUN SERPL-MCNC: 13 MG/DL (ref 7–25)
BUN/CREAT SERPL: NORMAL (CALC) (ref 6–22)
CALCIUM SERPL-MCNC: 9.5 MG/DL (ref 8.6–10.3)
CHLORIDE SERPL-SCNC: 104 MMOL/L (ref 98–110)
CHOLEST SERPL-MCNC: 164 MG/DL
CHOLEST/HDLC SERPL: 3.1 (CALC)
CO2 SERPL-SCNC: 26 MMOL/L (ref 20–32)
CREAT SERPL-MCNC: 1.1 MG/DL (ref 0.6–1.29)
EGFR: 85 ML/MIN/1.73M2
EOSINOPHIL # BLD AUTO: 218 CELLS/UL (ref 15–500)
EOSINOPHIL NFR BLD AUTO: 6.6 %
ERYTHROCYTE [DISTWIDTH] IN BLOOD BY AUTOMATED COUNT: 15.8 % (ref 11–15)
GLOBULIN SER CALC-MCNC: 3.2 G/DL (CALC) (ref 1.9–3.7)
GLUCOSE SERPL-MCNC: 95 MG/DL (ref 65–99)
HBA1C MFR BLD: 5.4 %
HCT VFR BLD AUTO: 46.2 % (ref 38.5–50)
HDLC SERPL-MCNC: 53 MG/DL
HGB BLD-MCNC: 13.8 G/DL (ref 13.2–17.1)
LDLC SERPL CALC-MCNC: 96 MG/DL (CALC)
LYMPHOCYTES # BLD AUTO: 1036 CELLS/UL (ref 850–3900)
LYMPHOCYTES NFR BLD AUTO: 31.4 %
MCH RBC QN AUTO: 21.7 PG (ref 27–33)
MCHC RBC AUTO-ENTMCNC: 29.9 G/DL (ref 32–36)
MCV RBC AUTO: 72.8 FL (ref 80–100)
MONOCYTES # BLD AUTO: 475 CELLS/UL (ref 200–950)
MONOCYTES NFR BLD AUTO: 14.4 %
NEUTROPHILS # BLD AUTO: 1561 CELLS/UL (ref 1500–7800)
NEUTROPHILS NFR BLD AUTO: 47.3 %
NONHDLC SERPL-MCNC: 111 MG/DL (CALC)
PLATELET # BLD AUTO: 321 THOUSAND/UL (ref 140–400)
PMV BLD REES-ECKER: 12.1 FL (ref 7.5–12.5)
POTASSIUM SERPL-SCNC: 4.2 MMOL/L (ref 3.5–5.3)
PROT SERPL-MCNC: 7.4 G/DL (ref 6.1–8.1)
PSA SERPL-MCNC: 0.83 NG/ML
RBC # BLD AUTO: 6.35 MILLION/UL (ref 4.2–5.8)
SODIUM SERPL-SCNC: 141 MMOL/L (ref 135–146)
TRIGL SERPL-MCNC: 64 MG/DL
TSH SERPL-ACNC: 1.46 MIU/L (ref 0.4–4.5)
URATE SERPL-MCNC: 7.4 MG/DL (ref 4–8)
WBC # BLD AUTO: 3.3 THOUSAND/UL (ref 3.8–10.8)

## 2025-07-09 ENCOUNTER — OFFICE VISIT (OUTPATIENT)
Dept: FAMILY MEDICINE | Facility: CLINIC | Age: 45
End: 2025-07-09
Payer: COMMERCIAL

## 2025-07-09 VITALS
BODY MASS INDEX: 28.25 KG/M2 | HEIGHT: 70 IN | SYSTOLIC BLOOD PRESSURE: 122 MMHG | TEMPERATURE: 97 F | DIASTOLIC BLOOD PRESSURE: 88 MMHG | WEIGHT: 197.31 LBS | HEART RATE: 98 BPM | OXYGEN SATURATION: 99 %

## 2025-07-09 DIAGNOSIS — Z80.42 FAMILY HISTORY OF PROSTATE CANCER IN FATHER: ICD-10-CM

## 2025-07-09 DIAGNOSIS — Z00.00 VISIT FOR WELL MAN HEALTH CHECK: Primary | ICD-10-CM

## 2025-07-09 DIAGNOSIS — I51.7 LAE (LEFT ATRIAL ENLARGEMENT): ICD-10-CM

## 2025-07-09 DIAGNOSIS — R71.8 MICROCYTOSIS: ICD-10-CM

## 2025-07-09 DIAGNOSIS — R07.81 RIB PAIN: ICD-10-CM

## 2025-07-09 DIAGNOSIS — Z87.39 HISTORY OF GOUT: ICD-10-CM

## 2025-07-09 DIAGNOSIS — R07.9 CHEST PAIN, UNSPECIFIED TYPE: ICD-10-CM

## 2025-07-09 DIAGNOSIS — I10 ESSENTIAL HYPERTENSION: ICD-10-CM

## 2025-07-09 PROCEDURE — 3044F HG A1C LEVEL LT 7.0%: CPT | Mod: CPTII,S$GLB,, | Performed by: FAMILY MEDICINE

## 2025-07-09 PROCEDURE — 3074F SYST BP LT 130 MM HG: CPT | Mod: CPTII,S$GLB,, | Performed by: FAMILY MEDICINE

## 2025-07-09 PROCEDURE — 99396 PREV VISIT EST AGE 40-64: CPT | Mod: S$GLB,,, | Performed by: FAMILY MEDICINE

## 2025-07-09 PROCEDURE — 1160F RVW MEDS BY RX/DR IN RCRD: CPT | Mod: CPTII,S$GLB,, | Performed by: FAMILY MEDICINE

## 2025-07-09 PROCEDURE — 3008F BODY MASS INDEX DOCD: CPT | Mod: CPTII,S$GLB,, | Performed by: FAMILY MEDICINE

## 2025-07-09 PROCEDURE — 3079F DIAST BP 80-89 MM HG: CPT | Mod: CPTII,S$GLB,, | Performed by: FAMILY MEDICINE

## 2025-07-09 PROCEDURE — 1159F MED LIST DOCD IN RCRD: CPT | Mod: CPTII,S$GLB,, | Performed by: FAMILY MEDICINE

## 2025-07-09 PROCEDURE — 99999 PR PBB SHADOW E&M-EST. PATIENT-LVL IV: CPT | Mod: PBBFAC,,, | Performed by: FAMILY MEDICINE

## 2025-07-09 RX ORDER — DICLOFENAC SODIUM 10 MG/G
2 GEL TOPICAL 4 TIMES DAILY
Qty: 100 G | Refills: 0 | Status: SHIPPED | OUTPATIENT
Start: 2025-07-09

## 2025-07-09 RX ORDER — AMLODIPINE BESYLATE 10 MG/1
10 TABLET ORAL DAILY
Qty: 90 TABLET | Refills: 2 | Status: SHIPPED | OUTPATIENT
Start: 2025-07-09

## 2025-07-09 NOTE — PROGRESS NOTES
Subjective:       Patient ID: Alli Lezama is a 45 y.o. male.    Chief Complaint: Annual Exam      Alli Lezama is a 45 y.o. male who presents today to establish care or for an annual exam    Diet/Exercise: eats grits and eggs out for breakfast. For lunch, he is eating out. Eats at home for dinner. Eats beans, stew, chicken. Eating out for breakfast and lunch is not new, even with new job. Not exercising outside of work.     Labs: reviewed    Colon Cancer Screening:   at age 46    HTN: on amlodipine 10 mg. He does report dizziness    Dizziness started Sunday. He reports that he was walking around with the kids and symptoms started. Symptoms come and go. Symptoms last for 3-4 seconds. Reports that this is happening 2-3 times a day. Every day since Sunday. Standing up for a long period of time makes him feel dizzy. Continues to report chest pain. This was evaluated with an EGD in 2024. This was normal. This is not related to the dizziness. Turning his head does not make him dizzy. No palpitations. Legs are not swelling. Voltaren gel does help chest pain, but it is still persistent.     Elevated uric acid: uric acid slightly high 4/20/2024. Has been trending down. Off allopurinol. No gout flares since at least April 2024. No joint pain, no joint swelling.  Not     Microcytic anemia: mild, has been intermittently present for >5 years. Repeat in 6-12 months.     PMHx: reviewed in EMR and updated  Meds: reviewed in EMR and updated  Shx: reviewed in EMR and updated  FMHx: dad with history of prostate cancer.   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 Changed in February, he used to work for [employer]. He lives with his wife (Kavitha) and two kids; daughter who was born in early 2022 and son born 2/2024. No pets at home. No smokers at home. Both kids are my patients.         Review of Systems      Health Maintenance Due   Topic Date Due    COVID-19 Vaccine (4 - 2024-25 season) 09/01/2024      Immunization History   Administered Date(s) Administered    COVID-19, MRNA, LN-S, PF (Pfizer) (Purple Cap) 04/20/2021, 05/11/2021, 12/15/2021    Tdap 12/30/2008, 12/10/2020       Results for orders placed or performed in visit on 05/01/25   Vitamin D    Collection Time: 05/01/25 10:59 AM   Result Value Ref Range    Vitamin D, 25-OH, Total 37 30 - 100 ng/mL   URIC ACID    Collection Time: 05/01/25 10:59 AM   Result Value Ref Range    Uric Acid 7.4 4.0 - 8.0 mg/dL   TSH    Collection Time: 05/01/25 10:59 AM   Result Value Ref Range    TSH 1.46 0.40 - 4.50 mIU/L   Lipid Panel    Collection Time: 05/01/25 10:59 AM   Result Value Ref Range    Cholesterol 164 <200 mg/dL    HDL 53 > OR = 40 mg/dL    Triglycerides 64 <150 mg/dL    LDL Cholesterol 96 mg/dL (calc)    HDL/Cholesterol Ratio 3.1 <5.0 (calc)    Non HDL Chol. (LDL+VLDL) 111 <130 mg/dL (calc)   Hemoglobin A1C    Collection Time: 05/01/25 10:59 AM   Result Value Ref Range    Hemoglobin A1C 5.4 <5.7 %   Comprehensive Metabolic Panel    Collection Time: 05/01/25 10:59 AM   Result Value Ref Range    Glucose 95 65 - 99 mg/dL    BUN 13 7 - 25 mg/dL    Creatinine 1.10 0.60 - 1.29 mg/dL    eGFR 85 > OR = 60 mL/min/1.73m2    BUN/Creatinine Ratio SEE NOTE: 6 - 22 (calc)    Sodium 141 135 - 146 mmol/L    Potassium 4.2 3.5 - 5.3 mmol/L    Chloride 104 98 - 110 mmol/L    CO2 26 20 - 32 mmol/L    Calcium 9.5 8.6 - 10.3 mg/dL    Total Protein 7.4 6.1 - 8.1 g/dL    Albumin 4.2 3.6 - 5.1 g/dL    Globulin, Total 3.2 1.9 - 3.7 g/dL (calc)    Albumin/Globulin Ratio 1.3 1.0 - 2.5 (calc)    Total Bilirubin 0.5 0.2 - 1.2 mg/dL    Alkaline Phosphatase 40 36 - 130 U/L    AST 13 10 - 40 U/L    ALT 9 9 - 46 U/L   CBC Auto Differential    Collection Time: 05/01/25 10:59 AM   Result Value Ref Range    WBC 3.3 (L) 3.8 - 10.8 Thousand/uL    RBC 6.35 (H) 4.20 - 5.80 Million/uL    Hemoglobin 13.8 13.2 - 17.1 g/dL    Hematocrit 46.2 38.5 - 50.0 %    MCV 72.8 (L) 80.0 - 100.0 fL    MCH 21.7 (L)  "27.0 - 33.0 pg    MCHC 29.9 (L) 32.0 - 36.0 g/dL    RDW 15.8 (H) 11.0 - 15.0 %    Platelets 321 140 - 400 Thousand/uL    MPV 12.1 7.5 - 12.5 fL    Neutrophils, Abs 1,561 1,500 - 7,800 cells/uL    Lymph # 1,036 850 - 3,900 cells/uL    Mono # 475 200 - 950 cells/uL    Eos # 218 15 - 500 cells/uL    Baso # 10 0 - 200 cells/uL    Neutrophils Relative 47.3 %    Lymph % 31.4 %    Mono % 14.4 %    Eosinophil % 6.6 %    Basophil % 0.3 %   PSA, Screening    Collection Time: 05/01/25 10:59 AM   Result Value Ref Range    PROSTATE SPECIFIC ANTIGEN, SCR - QUEST 0.83 < OR = 4.00 ng/mL       Objective:     Vitals:    07/09/25 1508   BP: 122/88   BP Location: Right arm   Patient Position: Sitting   Pulse: 98   Temp: 96.9 °F (36.1 °C)   TempSrc: Temporal   SpO2: 99%   Weight: 89.5 kg (197 lb 5 oz)   Height: 5' 10" (1.778 m)        Physical Exam  Vitals and nursing note reviewed.   Constitutional:       General: He is not in acute distress.     Appearance: He is not ill-appearing, toxic-appearing or diaphoretic.   HENT:      Right Ear: There is no impacted cerumen.      Left Ear: There is no impacted cerumen.      Nose: No congestion or rhinorrhea.      Mouth/Throat:      Pharynx: No oropharyngeal exudate or posterior oropharyngeal erythema.   Cardiovascular:      Rate and Rhythm: Normal rate and regular rhythm.   Pulmonary:      Effort: Pulmonary effort is normal.      Breath sounds: Normal breath sounds.   Abdominal:      Palpations: Abdomen is soft.      Tenderness: There is no abdominal tenderness.   Musculoskeletal:         General: No swelling.   Neurological:      General: No focal deficit present.      Mental Status: He is alert.   Psychiatric:         Mood and Affect: Mood normal.         Behavior: Behavior normal.         Thought Content: Thought content normal.         Judgment: Judgment normal.         Assessment:       1. Visit for well man health check    2. Essential hypertension    3. LAE (left atrial enlargement) - " seen on EKG in 2020    4. History of gout    5. Microcytosis, likely Thallesemia, mentzer index 11.05    6. Family history of prostate cancer in father    7. Rib pain    8. BMI 28.0-28.9,adult    9. Chest pain, unspecified type        Plan:       Continue amlodipine at current dose  Repeat BP: 120/83 (right), 120/80 (left)  Monitor uric acid  Low purine diet  Low salt diet    Chest pain: persisting, doubt cardiac, but will get stress test especially given persistence of pain and dizziness now    Dizziness: I suspect due to dehydration. INCREASE fluid intake  Start compression sock daily  If stress test normal and symptoms persist, contact us  May need to see ENT or cardiology, unclear    F/u 6 months, no labs prior.     Visit for well man health check    Essential hypertension  -     amLODIPine (NORVASC) 10 MG tablet; Take 1 tablet (10 mg total) by mouth once daily.  Dispense: 90 tablet; Refill: 2    LAE (left atrial enlargement) - seen on EKG in 2020    History of gout    Microcytosis, likely Thallesemia, mentzer index 11.05    Family history of prostate cancer in father    Rib pain  -     diclofenac sodium (VOLTAREN ARTHRITIS PAIN) 1 % Gel; Apply 2 g topically 4 (four) times daily.  Dispense: 100 g; Refill: 0    BMI 28.0-28.9,adult    Chest pain, unspecified type  -     Stress Echo Which stress agent will be used? Treadmill Exercise; Color Flow Doppler? No; Future

## 2025-08-03 DIAGNOSIS — I10 ESSENTIAL HYPERTENSION: ICD-10-CM

## 2025-08-03 NOTE — TELEPHONE ENCOUNTER
No care due was identified.  [facility] Embedded Care Due Messages. Reference number: [id number].   8/03/2025 6:17:37 AM CDT

## 2025-08-04 RX ORDER — AMLODIPINE BESYLATE 10 MG/1
10 TABLET ORAL
Qty: 90 TABLET | Refills: 2 | OUTPATIENT
Start: 2025-08-04

## 2025-08-04 NOTE — TELEPHONE ENCOUNTER
Refill Decision Note   Alli Lezama  is requesting a refill authorization.  Brief Assessment and Rationale for Refill:  Quick Discontinue     Medication Therapy Plan:  Receipt confirmed by pharmacy (7/9/2025  3:25 PM CDT)      Comments:     Note composed:11:27 AM 08/04/2025

## 2025-08-12 ENCOUNTER — HOSPITAL ENCOUNTER (OUTPATIENT)
Dept: CARDIOLOGY | Facility: HOSPITAL | Age: 45
Discharge: HOME | End: 2025-08-12
Attending: FAMILY MEDICINE
Payer: COMMERCIAL

## 2025-08-12 DIAGNOSIS — R07.9 CHEST PAIN, UNSPECIFIED TYPE: ICD-10-CM

## 2025-08-12 LAB
CV STRESS BASE HR: 68 BPM
DIASTOLIC BLOOD PRESSURE: 95 MMHG
OHS CV CPX 1 MINUTE RECOVERY HEART RATE: 129 BPM
OHS CV CPX 85 PERCENT MAX PREDICTED HEART RATE MALE: 150
OHS CV CPX ESTIMATED METS: 13
OHS CV CPX MAX PREDICTED HEART RATE: 176
OHS CV CPX PATIENT IS FEMALE: 0
OHS CV CPX PATIENT IS MALE: 1
OHS CV CPX PEAK DIASTOLIC BLOOD PRESSURE: 94 MMHG
OHS CV CPX PEAK HEAR RATE: 171 BPM
OHS CV CPX PEAK RATE PRESSURE PRODUCT: NORMAL
OHS CV CPX PEAK SYSTOLIC BLOOD PRESSURE: 162 MMHG
OHS CV CPX PERCENT MAX PREDICTED HEART RATE ACHIEVED: 97
OHS CV CPX RATE PRESSURE PRODUCT PRESENTING: 8432
STRESS ECHO POST EXERCISE DUR MIN: 11 MINUTES
STRESS ECHO POST EXERCISE DUR SEC: 30 SECONDS
SYSTOLIC BLOOD PRESSURE: 124 MMHG

## 2025-08-12 PROCEDURE — 93351 STRESS TTE COMPLETE: CPT | Mod: PN

## 2025-08-12 PROCEDURE — 93351 STRESS TTE COMPLETE: CPT | Mod: 26,,, | Performed by: STUDENT IN AN ORGANIZED HEALTH CARE EDUCATION/TRAINING PROGRAM

## 2025-08-19 ENCOUNTER — HOSPITAL ENCOUNTER (EMERGENCY)
Facility: HOSPITAL | Age: 45
Discharge: HOME | End: 2025-08-19
Attending: EMERGENCY MEDICINE
Payer: COMMERCIAL

## 2025-08-19 VITALS
OXYGEN SATURATION: 99 % | HEART RATE: 73 BPM | WEIGHT: 193 LBS | DIASTOLIC BLOOD PRESSURE: 88 MMHG | HEIGHT: 71 IN | RESPIRATION RATE: 18 BRPM | BODY MASS INDEX: 27.02 KG/M2 | SYSTOLIC BLOOD PRESSURE: 133 MMHG | TEMPERATURE: 98 F

## 2025-08-19 DIAGNOSIS — K04.7 DENTAL ABSCESS: Primary | ICD-10-CM

## 2025-08-19 PROCEDURE — 25000003 PHARM REV CODE 250: Mod: ER | Performed by: EMERGENCY MEDICINE

## 2025-08-19 PROCEDURE — 99284 EMERGENCY DEPT VISIT MOD MDM: CPT | Mod: 25,ER

## 2025-08-19 PROCEDURE — 96372 THER/PROPH/DIAG INJ SC/IM: CPT | Performed by: EMERGENCY MEDICINE

## 2025-08-19 PROCEDURE — 63600175 PHARM REV CODE 636 W HCPCS: Mod: JZ,TB,ER | Performed by: EMERGENCY MEDICINE

## 2025-08-19 RX ORDER — KETOROLAC TROMETHAMINE 30 MG/ML
30 INJECTION, SOLUTION INTRAMUSCULAR; INTRAVENOUS
Status: COMPLETED | OUTPATIENT
Start: 2025-08-19 | End: 2025-08-19

## 2025-08-19 RX ORDER — AMOXICILLIN AND CLAVULANATE POTASSIUM 875; 125 MG/1; MG/1
1 TABLET, FILM COATED ORAL
Status: COMPLETED | OUTPATIENT
Start: 2025-08-19 | End: 2025-08-19

## 2025-08-19 RX ORDER — AMOXICILLIN AND CLAVULANATE POTASSIUM 875; 125 MG/1; MG/1
1 TABLET, FILM COATED ORAL 2 TIMES DAILY
Qty: 14 TABLET | Refills: 0 | Status: SHIPPED | OUTPATIENT
Start: 2025-08-19

## 2025-08-19 RX ADMIN — KETOROLAC TROMETHAMINE 30 MG: 30 INJECTION, SOLUTION INTRAMUSCULAR; INTRAVENOUS at 08:08

## 2025-08-19 RX ADMIN — AMOXICILLIN AND CLAVULANATE POTASSIUM 1 TABLET: 875; 125 TABLET, FILM COATED ORAL at 08:08
